# Patient Record
Sex: FEMALE | Race: WHITE | Employment: FULL TIME | URBAN - METROPOLITAN AREA
[De-identification: names, ages, dates, MRNs, and addresses within clinical notes are randomized per-mention and may not be internally consistent; named-entity substitution may affect disease eponyms.]

---

## 2017-02-16 ENCOUNTER — TRANSFERRED RECORDS (OUTPATIENT)
Dept: HEALTH INFORMATION MANAGEMENT | Facility: CLINIC | Age: 52
End: 2017-02-16

## 2017-05-04 ENCOUNTER — TRANSFERRED RECORDS (OUTPATIENT)
Dept: HEALTH INFORMATION MANAGEMENT | Facility: CLINIC | Age: 52
End: 2017-05-04

## 2017-05-04 LAB
HPV ABSTRACT: NORMAL
PAP-ABSTRACT: NORMAL

## 2017-08-18 ENCOUNTER — HEALTH MAINTENANCE LETTER (OUTPATIENT)
Age: 52
End: 2017-08-18

## 2018-03-23 ENCOUNTER — MYC MEDICAL ADVICE (OUTPATIENT)
Dept: FAMILY MEDICINE | Facility: OTHER | Age: 53
End: 2018-03-23

## 2018-03-23 DIAGNOSIS — Z12.11 SPECIAL SCREENING FOR MALIGNANT NEOPLASMS, COLON: Primary | ICD-10-CM

## 2018-03-26 ENCOUNTER — TELEPHONE (OUTPATIENT)
Dept: FAMILY MEDICINE | Facility: OTHER | Age: 53
End: 2018-03-26

## 2018-03-26 NOTE — TELEPHONE ENCOUNTER
Left message for patient to return call to schedule EGD/colonoscopy. If Briana or Ania are not available, please transfer to same day surgery      Home # disc, call Cell [phone]   schedule with REX or evelin

## 2018-03-27 NOTE — TELEPHONE ENCOUNTER
Left message for patient to return call to schedule colonoscopy or EGD. If Ania or Briana are unavailable, please transfer to the surgery center.

## 2018-03-29 ENCOUNTER — TELEPHONE (OUTPATIENT)
Dept: FAMILY MEDICINE | Facility: OTHER | Age: 53
End: 2018-03-29

## 2018-03-29 NOTE — TELEPHONE ENCOUNTER
Reason for Call:  Other appointment    Detailed comments: please call to schedule colonoscopy    Phone Number Patient can be reached at: Home number on file [phone] (home)    Best Time: anytime    Can we leave a detailed message on this number? YES    Call taken on 3/29/2018 at 10:31 AM by Ayesha Guaman

## 2018-04-17 ENCOUNTER — TELEPHONE (OUTPATIENT)
Dept: FAMILY MEDICINE | Facility: OTHER | Age: 53
End: 2018-04-17

## 2018-04-17 DIAGNOSIS — Z12.11 SPECIAL SCREENING FOR MALIGNANT NEOPLASMS, COLON: Primary | ICD-10-CM

## 2018-04-17 NOTE — TELEPHONE ENCOUNTER
Received following message in inbasket, but wouldn't let me open an encounter, so copied below:    Juan Harrington RN Christiaansen, Tori F, MD        Caller: Unspecified (Yesterday,  1:18 PM)                     Pt would prefer a different prep for her colonoscopy. States her  had a Rx prep that seemed easier than the miralax mixing and she would prefer this. Please contact in regards to this.     Thanks, Juan PATRICK         I don't know what prep she is talking about, but could send over the Golyteley as that is the only other prep I know, if that is what she wants.    FYI--I haven't seen this patient since 2012.

## 2018-04-17 NOTE — TELEPHONE ENCOUNTER
Left message for pt to call back, please have her clarify prep for colonoscopy.  Per TC she has not seen pt since 2012.  Elizabet Sharma, CMA

## 2018-04-17 NOTE — TELEPHONE ENCOUNTER
Spoke to patient and informed her of message below plus I sent her mychart message informing her of this plus the prep instructions.

## 2018-04-17 NOTE — TELEPHONE ENCOUNTER
Spoke with pt, she would definitely like to try the Surinder. Please send rx to [facility].     Thank you.

## 2018-04-23 ENCOUNTER — HOSPITAL ENCOUNTER (OUTPATIENT)
Facility: CLINIC | Age: 53
Discharge: HOME | End: 2018-04-23
Attending: INTERNAL MEDICINE | Admitting: INTERNAL MEDICINE
Payer: COMMERCIAL

## 2018-04-23 ENCOUNTER — SURGERY (OUTPATIENT)
Age: 53
End: 2018-04-23

## 2018-04-23 VITALS
DIASTOLIC BLOOD PRESSURE: 50 MMHG | RESPIRATION RATE: 16 BRPM | OXYGEN SATURATION: 99 % | TEMPERATURE: 98.4 F | HEART RATE: 90 BPM | SYSTOLIC BLOOD PRESSURE: 100 MMHG

## 2018-04-23 LAB — COLONOSCOPY: NORMAL

## 2018-04-23 PROCEDURE — 25000128 H RX IP 250 OP 636: Performed by: INTERNAL MEDICINE

## 2018-04-23 PROCEDURE — 45378 DIAGNOSTIC COLONOSCOPY: CPT | Performed by: INTERNAL MEDICINE

## 2018-04-23 PROCEDURE — 25000125 ZZHC RX 250: Performed by: INTERNAL MEDICINE

## 2018-04-23 PROCEDURE — 25000132 ZZH RX MED GY IP 250 OP 250 PS 637: Performed by: INTERNAL MEDICINE

## 2018-04-23 PROCEDURE — G0121 COLON CA SCRN NOT HI RSK IND: HCPCS | Performed by: INTERNAL MEDICINE

## 2018-04-23 PROCEDURE — 40000296 ZZH STATISTIC ENDO RECOVERY CLASS 1:2 FIRST HOUR: Performed by: INTERNAL MEDICINE

## 2018-04-23 PROCEDURE — G0500 MOD SEDAT ENDO SERVICE >5YRS: HCPCS

## 2018-04-23 RX ORDER — SIMETHICONE
LIQUID (ML) MISCELLANEOUS PRN
Status: DISCONTINUED | OUTPATIENT
Start: 2018-04-23 | End: 2018-04-23 | Stop reason: HOSPADM

## 2018-04-23 RX ORDER — FENTANYL CITRATE 50 UG/ML
INJECTION, SOLUTION INTRAMUSCULAR; INTRAVENOUS PRN
Status: DISCONTINUED | OUTPATIENT
Start: 2018-04-23 | End: 2018-04-23 | Stop reason: HOSPADM

## 2018-04-23 RX ORDER — ONDANSETRON 2 MG/ML
4 INJECTION INTRAMUSCULAR; INTRAVENOUS
Status: DISCONTINUED | OUTPATIENT
Start: 2018-04-23 | End: 2018-04-23 | Stop reason: HOSPADM

## 2018-04-23 RX ORDER — LIDOCAINE 40 MG/G
CREAM TOPICAL
Status: DISCONTINUED | OUTPATIENT
Start: 2018-04-23 | End: 2018-04-23 | Stop reason: HOSPADM

## 2018-04-23 RX ADMIN — FENTANYL CITRATE 50 MCG: 50 INJECTION, SOLUTION INTRAMUSCULAR; INTRAVENOUS at 10:40

## 2018-04-23 RX ADMIN — MIDAZOLAM 2 MG: 1 INJECTION INTRAMUSCULAR; INTRAVENOUS at 10:40

## 2018-04-23 RX ADMIN — FENTANYL CITRATE 25 MCG: 50 INJECTION, SOLUTION INTRAMUSCULAR; INTRAVENOUS at 10:47

## 2018-04-23 RX ADMIN — Medication 1 ML: at 10:40

## 2018-04-23 RX ADMIN — LIDOCAINE HYDROCHLORIDE 1 ML: 10 INJECTION, SOLUTION EPIDURAL; INFILTRATION; INTRACAUDAL at 09:59

## 2018-04-23 RX ADMIN — FENTANYL CITRATE 25 MCG: 50 INJECTION, SOLUTION INTRAMUSCULAR; INTRAVENOUS at 10:50

## 2018-04-23 RX ADMIN — MIDAZOLAM 1 MG: 1 INJECTION INTRAMUSCULAR; INTRAVENOUS at 10:42

## 2018-04-23 RX ADMIN — MIDAZOLAM 1 MG: 1 INJECTION INTRAMUSCULAR; INTRAVENOUS at 10:41

## 2018-04-23 RX ADMIN — MIDAZOLAM 1 MG: 1 INJECTION INTRAMUSCULAR; INTRAVENOUS at 10:43

## 2018-04-23 RX ADMIN — MIDAZOLAM 1 MG: 1 INJECTION INTRAMUSCULAR; INTRAVENOUS at 10:52

## 2018-04-23 NOTE — CONSULTS
[facility] GI Pre-Procedure Physical Assessment    Michelle Roach MRN# [id number]   Age: 52 year old YOB: 1965      Date of Surgery: 4/23/2018  Location [facility]      Date of Exam 4/23/2018 Facility (Same day)       Home clinic: [facility]  Primary care provider: Deborah Vaughn         Active problem list:   Patient Active Problem List   Diagnosis     GERD (gastroesophageal reflux disease)            Medications (include herbals and vitamins):   Any Plavix use in the last 7 days?  No     Current Facility-Administered Medications   Medication     lidocaine (LMX4) kit     lidocaine 1 % 1 mL     ondansetron (ZOFRAN) injection 4 mg     sodium chloride (PF) 0.9% PF flush 3 mL     sodium chloride (PF) 0.9% PF flush 3 mL             Allergies:      Allergies   Allergen Reactions     Bactrim Hives     Codeine      migraines     Erythromycin      GI upset     Allergy to Latex?  No  Allergy to tape?    No          Social History:     Social History   Substance Use Topics     Smoking status: Never Smoker     Smokeless tobacco: Never Used      Comment: No smokers in home     Alcohol use No            Physical Exam:   All vitals have been reviewed  Blood pressure 107/65, pulse 90, temperature 98.4  F (36.9  C), temperature source Oral, resp. rate 18, SpO2 98 %.  Airway assessment:   Patient is able to open mouth wide  Patient is able to stick out tongue      Lungs:   No increased work of breathing, good air exchange, clear to auscultation bilaterally, no crackles or wheezing      Cardiovascular:   Normal apical impulse, regular rate and rhythm, normal S1 and S2, no S3 or S4, and no murmur noted           Lab / Radiology Results:   All laboratory data reviewed          Assessment:   Appropriately NPO  Chief complaint or anatomic assessment of involved area: screening colonoscopy         Plan:   Moderate (conscious) sedation     Patient's active problems diagnostically  and therapeutically optimized for the planned procedure  Risks, benefits, alternatives to sedation and blood explained and consent obtained  Risks, benefits, alternatives to procedure explained and consent obtained  Orders and progress notes are in the chart  Discharge from Phase 1 and / or Phase 2 recovery when patient meets criteria    I have reviewed the history and physical, lab finding(s), diagnostic data, medicaitons, and the plan for sedation.  I have determined this patient to be an appropriate candidate for the planned sedation / procedure and have reassessed the patient immediately prior to sedation / procedure.    I have personally and medically directed the administration of medications used.    Michael Mims MD

## 2018-06-27 ENCOUNTER — RADIANT APPOINTMENT (OUTPATIENT)
Dept: MAMMOGRAPHY | Facility: CLINIC | Age: 53
End: 2018-06-27
Attending: FAMILY MEDICINE
Payer: COMMERCIAL

## 2018-06-27 DIAGNOSIS — R92.8 ABNORMAL MAMMOGRAM: Primary | ICD-10-CM

## 2018-06-27 DIAGNOSIS — Z12.31 VISIT FOR SCREENING MAMMOGRAM: ICD-10-CM

## 2018-06-27 PROCEDURE — 77067 SCR MAMMO BI INCL CAD: CPT | Performed by: STUDENT IN AN ORGANIZED HEALTH CARE EDUCATION/TRAINING PROGRAM

## 2018-06-29 ENCOUNTER — E-VISIT (OUTPATIENT)
Dept: FAMILY MEDICINE | Facility: OTHER | Age: 53
End: 2018-06-29
Payer: COMMERCIAL

## 2018-06-29 DIAGNOSIS — R30.0 DYSURIA: Primary | ICD-10-CM

## 2018-06-29 PROCEDURE — 99444 ZZC PHYSICIAN ONLINE EVALUATION & MANAGEMENT SERVICE: CPT | Performed by: FAMILY MEDICINE

## 2018-07-03 ENCOUNTER — RADIANT APPOINTMENT (OUTPATIENT)
Dept: ULTRASOUND IMAGING | Facility: CLINIC | Age: 53
End: 2018-07-03
Attending: FAMILY MEDICINE
Payer: COMMERCIAL

## 2018-07-03 ENCOUNTER — RADIANT APPOINTMENT (OUTPATIENT)
Dept: MAMMOGRAPHY | Facility: CLINIC | Age: 53
End: 2018-07-03
Attending: FAMILY MEDICINE
Payer: COMMERCIAL

## 2018-07-03 DIAGNOSIS — R92.8 ABNORMAL MAMMOGRAM: ICD-10-CM

## 2018-07-03 PROCEDURE — 77065 DX MAMMO INCL CAD UNI: CPT | Performed by: STUDENT IN AN ORGANIZED HEALTH CARE EDUCATION/TRAINING PROGRAM

## 2018-07-03 PROCEDURE — G0279 TOMOSYNTHESIS, MAMMO: HCPCS | Performed by: STUDENT IN AN ORGANIZED HEALTH CARE EDUCATION/TRAINING PROGRAM

## 2018-07-03 PROCEDURE — 76642 ULTRASOUND BREAST LIMITED: CPT | Mod: RT | Performed by: STUDENT IN AN ORGANIZED HEALTH CARE EDUCATION/TRAINING PROGRAM

## 2019-02-05 ENCOUNTER — OFFICE VISIT (OUTPATIENT)
Dept: PEDIATRICS | Facility: CLINIC | Age: 54
End: 2019-02-05
Payer: COMMERCIAL

## 2019-02-05 VITALS
DIASTOLIC BLOOD PRESSURE: 65 MMHG | HEART RATE: 70 BPM | SYSTOLIC BLOOD PRESSURE: 106 MMHG | OXYGEN SATURATION: 97 % | BODY MASS INDEX: 21.51 KG/M2 | WEIGHT: 129.1 LBS | TEMPERATURE: 98 F | HEIGHT: 65 IN

## 2019-02-05 DIAGNOSIS — H61.21 IMPACTED CERUMEN OF RIGHT EAR: ICD-10-CM

## 2019-02-05 DIAGNOSIS — H93.8X1 SENSATION OF PLUGGED EAR ON RIGHT SIDE: Primary | ICD-10-CM

## 2019-02-05 PROCEDURE — 99213 OFFICE O/P EST LOW 20 MIN: CPT | Mod: 25 | Performed by: FAMILY MEDICINE

## 2019-02-05 PROCEDURE — 69209 REMOVE IMPACTED EAR WAX UNI: CPT | Mod: RT | Performed by: FAMILY MEDICINE

## 2019-02-05 ASSESSMENT — PAIN SCALES - GENERAL: PAINLEVEL: NO PAIN (0)

## 2019-02-05 ASSESSMENT — MIFFLIN-ST. JEOR: SCORE: 1190.43

## 2019-02-05 NOTE — PROGRESS NOTES
SUBJECTIVE:   Michelle Roach is a 53 year old female who presents to clinic today for the following health issues    Patient with no significant past medical history is here with concerns of having blood to sensation of hearing loss.  Had history of impacted wax in the past.  Patient denies current concerning symptoms including left ear symptoms, sore throat, history of allergies,:  Denies ear pain or trauma  Patient tried over-the-counter Debrox eardrops with worsening symptoms    Acute Illness   Acute illness concerns: patient is not able to hear out of right ear. No pain. No other symptoms. Patient states history of wax build up in that ear.  Onset: 3 weeks    Fever: no    Chills/Sweats: no    Headache (location?): no    Sinus Pressure:no    Conjunctivitis:  no    Ear Pain: no    Rhinorrhea: no    Congestion: no    Sore Throat: no     Cough: no    Wheeze: no    Decreased Appetite: no    Nausea: no    Vomiting: no    Diarrhea:  no    Dysuria/Freq.: no    Fatigue/Achiness: no    Sick/Strep Exposure: no     Therapies Tried and outcome: debrox ears drops - made it worse          Problem list and histories reviewed & adjusted, as indicated.  Additional history: as documented    Patient Active Problem List   Diagnosis     GERD (gastroesophageal reflux disease)     Past Surgical History:   Procedure Laterality Date     C NONSPECIFIC PROCEDURE  1998    LEEP     COLONOSCOPY N/A 4/23/2018    Procedure: COLONOSCOPY;  colonoscopy;  Surgeon: Michael Mims MD;  Location: [facility]     PELVIS LAPAROSCOPY,DX  1996    Endometriosis       Social History     Tobacco Use     Smoking status: Never Smoker     Smokeless tobacco: Never Used     Tobacco comment: No smokers in home   Substance Use Topics     Alcohol use: No     Family History   Problem Relation Age of Onset     Cancer Paternal Grandfather          Current Outpatient Medications   Medication Sig Dispense Refill     Acetaminophen (TYLENOL EXTRA STRENGTH PO) Take 1 tablet by  "mouth as needed.       B Complex Vitamins (VITAMIN B COMPLEX PO) Take 1 tablet by mouth daily.       Cholecalciferol (VITAMIN D) 1000 UNIT capsule Take 1 capsule by mouth daily.       FLONASE INHA 50 MCG/DOSE NA INHALE 2 SPRAYS IN EACH NOSTRIL ONCE DAILY 1 Inhaler 4     Multiple Vitamin (MULTIVITAMINS PO) Take 1 tablet by mouth daily.       Allergies   Allergen Reactions     Bactrim Hives     Codeine      migraines     Erythromycin      GI upset     Recent Labs   Lab Test 03/21/12  0953   CR 0.57   GFRESTIMATED >90   GFRESTBLACK >90   POTASSIUM 4.7   TSH 1.48      BP Readings from Last 3 Encounters:   02/05/19 106/65   04/23/18 100/50   06/17/15 108/66    Wt Readings from Last 3 Encounters:   02/05/19 58.6 kg (129 lb 1.6 oz)   06/17/15 58.4 kg (128 lb 12.8 oz)   03/13/13 57.6 kg (126 lb 14.4 oz)                  Labs reviewed in EPIC    Reviewed and updated as needed this visit by clinical staff       Reviewed and updated as needed this visit by Provider         ROS:  CONSTITUTIONAL: NEGATIVE for fever, chills, change in weight  INTEGUMENTARY/SKIN: NEGATIVE for worrisome rashes, moles or lesions  EYES: NEGATIVE for vision changes or irritation  ENT/MOUTH: as above  RESP: NEGATIVE for significant cough or SOB  CV: NEGATIVE for chest pain, palpitations or peripheral edema    OBJECTIVE:     /65 (BP Location: Right arm, Patient Position: Sitting, Cuff Size: Adult Regular)   Pulse 70   Temp 98  F (36.7  C) (Oral)   Ht 1.657 m (5' 5.25\")   Wt 58.6 kg (129 lb 1.6 oz)   SpO2 97%   BMI 21.32 kg/m    Body mass index is 21.32 kg/m .  GENERAL: healthy, alert and no distress  EYES: Eyes grossly normal to inspection  HENT: normal cephalic/atraumatic, right ear: occluded with wax, left ear: normal: no effusions, no erythema, normal landmarks, nose and mouth without ulcers or lesions, oropharynx clear and oral mucous membranes moist  NECK: no adenopathy, no asymmetry, masses, or scars and thyroid normal to " palpation    Diagnostic Test Results:  none     ASSESSMENT/PLAN:             1. Sensation of plugged ear on right side  ddx-Earwax this is eustachian tube dysfunction    2. Impacted cerumen of right ear  Ear wash was done by the [facility] staff  Moderate amount of wax was removed  Recommended not to use Q-tips  Can try over-the-counter ear washes  Follow-up as needed  Patient can try over-the-counter Debrox eardrops 2-3 times a week as a routine to prevent future episodes of wax impaction  Patient verbalised understanding and is agreeable to the plan.    - HC REMOVAL IMPACTED CERUMEN IRRIGATION/LVG UNILAT    Chart documentation done in part with Dragon Voice recognition Software. Although reviewed after completion, some word and grammatical error may remain.    See Patient Instructions    Willis Landaverde MD  [facility]

## 2019-02-05 NOTE — PATIENT INSTRUCTIONS
Patient Education     Impacted Earwax     Inner ear structures including ear canal and eardrum.     Impacted earwax is a buildup of the natural wax in the ear (cerumen). Impacted earwax is very common. It can cause symptoms such as hearing loss. It can also make it difficult for a doctor to examine your ear.  Understanding earwax  Tiny glands in your ear make substances that combine with dead skin cells to form earwax. Earwax helps protect your ear canal from water, dirt, infection, and injury. Over time, earwax travels from the inner part of your ear canal to the entrance of the canal. Then it falls away naturally. But in some cases, it can t travel to the entrance of the canal. This may be because of a health condition or objects put in the ear. With age, earwax tends to become harder and less fluid. Older adults are more likely to have problems with earwax buildup.  What causes impacted earwax?  Earwax can build up because of many health conditions. Some cause a physical blockage. Others cause too much earwax to be made. Health conditions that can cause earwax buildup include:    Use of cotton swabs to clean deep in the ear canal    Bony blockage in the ear (osteoma or exostoses)    Infections, such as  infection of the outer ear (external otitis)    Skin disease, such as eczema    Autoimmune diseases, such as lupus    A narrowed ear canal from birth, chronic inflammation, or injury    Too much earwax because of injury    Too much earwax because of  water in the ear canal  Objects repeatedly placed in the ear can also cause impacted earwax. For example, putting cotton swabs in the ear may push the wax deeper into the ear. Over time, this may cause blockage. Hearing aids, swimming plugs, and swim molds can cause the same problem when used again and again.  In some cases, the cause of impacted earwax is not known.  Symptoms of impacted earwax  Excess earwax usually does not cause any symptoms, unless there is a  large amount of buildup. Then it may cause symptoms such as:    Hearing loss    Earache    Sense of ear fullness    Itching in the ear    Odor from the ear    Ear drainage    Dizziness    Ringing in the ears    Cough  Treatment for impacted earwax  If you don t have symptoms, you may not need treatment. Often, the earwax goes away on its own with time. If you have symptoms, you may have one or more treatments such as:    Eardrops to soften the earwax. This helps it leave the ear over time.    Rinsing (irrigation) of the ear canal with water. This is done in a doctor s office.    Removal of the earwax with small tools. This is also done in a doctor s office.  In rare cases, some treatments for earwax removal may cause complications such as:    Infection of the outer ear (otitis external)    Earache    Short-term hearing loss    Dizziness    Water trapped in the ear canal    Hole in the eardrum    Ringing in the ears    Bleeding from the ear  Talk with your healthcare provider about which risks apply most to you.  Don t use these at home  Healthcare providers do not advise use of ear candles or ear vacuum kits. These methods are not shown to work and may cause problems.   Preventing impacted earwax  You may not be able to prevent impacted earwax if you have a health condition that causes it, such as eczema. In other cases, you may be able to prevent earwax buildup by:    Using ear drops once a week    Having routine cleaning of the ear about every 6 months    Not using cotton swabs in the ear  When to call the healthcare provider  Call your healthcare provider if you have symptoms of impacted earwax. Also call right away if you have severe symptoms after earwax removal. These may include bleeding or severe ear pain.   Date Last Reviewed: 5/1/2017 2000-2018 [employer]. [address] [place] [address], [place] [address]. All rights reserved. This information is not intended as a substitute for professional  medical care. Always follow your healthcare professional's instructions.           Patient Education       Earwax, Home Treatment    Everyone produces earwax from the lining of the ear canal. It serves to lubricate and protect the ear. The wax that forms in the canal naturally moves toward the outside of the ear and falls out. Sometimes the ear canal may contain too much wax. This can cause a blockage and loss of hearing. Directions are given below for home treatment.  Home care  If your doctor has advised you to remove a wax blockage yourself, follow these directions:    Unless a medicine was prescribed, you may use an over-the-counter product made for clearing earwax. These contain carbamide peroxide. Lie down with the blocked ear facing upward. Apply one dropper full of medicine and wait a few minutes. Grasp the outer ear and wiggle it to help the solution enter the canal.    Lean over a sink or basin with the blocked ear facing downward. Use a bulb syringe filled with warm (not hot or cold) water to rinse the ear several times. Use gentle pressure only.    If you are having trouble draining the water out of your ear canal, put a few drops of rubbing alcohol (isopropyl alcohol) into the ear canal. This will help remove the remaining water.    Repeat this procedure once a day for up to three days, or until your hearing is back to normal. Do not use this treatment for more than three days in a row.  Don ts    Don t use cold water to rinse the ear. This will make you dizzy.    Don t perform this procedure if you have an ear infection.    Don t perform this procedure if you have a ruptured eardrum.    Don t use cotton swabs, matches, hairpins, keys, or other objects to  clean  the ear canal. This can cause infection of the ear canal or rupture the eardrum. Because of their size and shape, cotton swabs can push earwax deeper into the ear canal instead of removing it.  Follow-up care  Follow up with your health care  provider if you are not improving after three cleaning attempts, or as advised.  When to seek medical advice  Call your health care provider right away if any of these occur:    Worsening ear pain    Fever of 101 F (38.3 C) or higher, or as directed by your health care provider    Hearing does not return to normal after three days of treatment    Fluid drainage or bleeding from the ear canal    Swelling, redness, or tenderness of the outer ear    Headache, neck pain, or stiff neck    0391-2559 [employer]. [address] [place], [place] [id number]. All rights reserved. This information is not intended as a substitute for professional medical care. Always follow your healthcare professional's instructions.

## 2019-06-07 ENCOUNTER — OFFICE VISIT (OUTPATIENT)
Dept: ORTHOPEDICS | Facility: CLINIC | Age: 54
End: 2019-06-07
Payer: COMMERCIAL

## 2019-06-07 VITALS — BODY MASS INDEX: 21.49 KG/M2 | HEIGHT: 65 IN | WEIGHT: 129 LBS

## 2019-06-07 DIAGNOSIS — M77.12 LATERAL EPICONDYLITIS OF LEFT ELBOW: ICD-10-CM

## 2019-06-07 DIAGNOSIS — M77.11 LATERAL EPICONDYLITIS OF RIGHT ELBOW: Primary | ICD-10-CM

## 2019-06-07 PROCEDURE — 99213 OFFICE O/P EST LOW 20 MIN: CPT | Performed by: FAMILY MEDICINE

## 2019-06-07 ASSESSMENT — PAIN SCALES - GENERAL: PAINLEVEL: NO PAIN (0)

## 2019-06-07 ASSESSMENT — MIFFLIN-ST. JEOR: SCORE: 1189.98

## 2019-06-07 NOTE — PROGRESS NOTES
"      [facility]  6/7/2019    Michelle Roach's chief complaint for this visit includes:  Chief Complaint   Patient presents with     Consult     bilateral elbow/forearm pain, right greater then left. no known injury started during the winter after shoveling. no treatments to date     PCP: Deborah Vaughn    Referring Provider:  Referred Self, MD  No address on file    Ht 1.657 m (5' 5.25\")   Wt 58.5 kg (129 lb)   BMI 21.30 kg/m    No Pain (0)       Reason for visit:     What part of your body is injured / painful?  bilateral elbow/forearm     What caused the injury /pain? No inciting event     How long ago did your injury occur or pain begin? several months ago    What are your most bothersome symptoms? Pain    How would you characterize your symptom?  aching    What makes your symptoms better? Rest    What makes your symptoms worse? Movement    Have you been previously seen for this problem? No    Medical History:    Any recent changes to your medical history? No    Any new medication prescribed since last visit? No    Have you had surgery on this body part before? No    Social History:    Occupation: Property Management     Handedness: Right    Review of Systems:    Do you have fever, chills, weight loss? No    Do you have any vision problems? No    Do you have any chest pain or edema? No    Do you have any shortness of breath or wheezing?  No    Do you have stomach problems? No    Do you have any numbness or focal weakness? No    Do you have diabetes? No    Do you have problems with bleeding or clotting? No    Do you have an rashes or other skin lesions? No         "

## 2019-06-07 NOTE — PROGRESS NOTES
CHIEF COMPLAINT:  Consult (bilateral elbow/forearm pain, right greater then left. no known injury started during the winter after shoveling. no treatments to date)       HISTORY OF PRESENT ILLNESS  Ms. Roach is a pleasant 53 year old year old female who presents to clinic today with right elbow pain.  Patient states taht elbow pain began during the winter months. She does not recall one specific incident but recalls insidious onset of this pain while shoveling, especially in February.  Since this time, despite cessation of shoveling, she has had continued pain of bilateral forearms/elbow, however significantly worse on the right.  Worse with movement of arm, carrying objects and using hands.  Improved with rest.  Patient has not noticed any significant swelling or ecchymosis.  Treatment to date has included ibuprofen, forearm chopat, and relative rest from offending activity.  Her left elbow has responded relatively well to treatment but the right elbow remains recalcitrant to treatments thus far.      Additional history: as documented    MEDICAL HISTORY  Patient Active Problem List   Diagnosis     GERD (gastroesophageal reflux disease)       Current Outpatient Medications   Medication Sig Dispense Refill     diclofenac (VOLTAREN) 1 % topical gel Place 2 g onto the skin 4 times daily 100 g 3     Acetaminophen (TYLENOL EXTRA STRENGTH PO) Take 1 tablet by mouth as needed.       B Complex Vitamins (VITAMIN B COMPLEX PO) Take 1 tablet by mouth daily.       Cholecalciferol (VITAMIN D) 1000 UNIT capsule Take 1 capsule by mouth daily.       FLONASE INHA 50 MCG/DOSE NA INHALE 2 SPRAYS IN EACH NOSTRIL ONCE DAILY (Patient not taking: No sig reported) 1 Inhaler 4     Multiple Vitamin (MULTIVITAMINS PO) Take 1 tablet by mouth daily.         Allergies   Allergen Reactions     Bactrim Hives     Codeine      migraines     Erythromycin      GI upset       Family History   Problem Relation Age of Onset     Cancer Paternal  "Grandfather        Additional medical/Social/Surgical histories reviewed in [facility] and updated as appropriate.     REVIEW OF SYSTEMS (6/7/2019)  A 10-point review of systems was obtained and is negative except for as noted in the HPI.      PHYSICAL EXAM  Ht 1.657 m (5' 5.25\")   Wt 58.5 kg (129 lb)   BMI 21.30 kg/m      General  - normal appearance, in no obvious distress  CV  - normal radial pulse  Pulm  - normal respiratory pattern, non-labored  Musculoskeletal - bilateral elbow  - inspection: normal joint alignment, no obvious deformity, no soft tissue swelling laterally  - palpation: Right- tender at the origin of the common extensor tendon on the right with extension into extensor musculature. Left- mildly tender at origin of common extensor tendon only  - ROM:  160 deg flexion   0 deg extension   90 deg pronation   90 deg supination  - strength: Right- 5/5 wrist extension with elbow flexed, 5/5 with elbow extended, painful resisted extension of long finger with elbow flexed, worse with extension, 5/5  strength. Left- Mild pain with resisted wrist extension only.  - special tests:  (-) varus  (-) valgus  (-) Tinel's  Neuro  - no sensory or motor deficit, grossly normal coordination, normal muscle tone  Skin  - no ecchymosis, erythema, warmth, or induration, no obvious rash  Psych  - interactive, appropriate, normal mood and affect    IMAGING : Deferred today     ASSESSMENT & PLAN  Ms. Roach is a 53 year old year old female who presents to clinic today with right >left lateral epciondylitis.    Diagnosis: Lateral epicondylitis of bilateral upper extremities.    -Right wrist brace provided for use with activity daily  -HEP for stretching/strengthening  -Ice massage discussed  -Diclofenac topical to proximal forearms; cannot tolerate NSAIDs orally  -Follow up 4 weeks if persisting    It was a pleasure seeing Michelle today.    Michael Jarvis DO, CAQSM  [facility]    "

## 2019-06-07 NOTE — PATIENT INSTRUCTIONS
"Tennis Elbow Instructions    LATERAL EPICONDYLITIS (TENNIS ELBOW):    Pain on the outside of elbow worsened with any gripping activity even as little as lifting a coffee cup; weakness of ; aching pain    Usually there is not any numbness or tingling involved (please let us know if this is happening as it may be something else)    TREATMENT:    Avoid aggravating activities until pain free at rest and with exercises. Then return slowly with pain being your guide. IF IT HURTS, DO NOT DO IT!    Wrist brace allows the muscles to relax in neutral position. The muscles that extend and rotate your wrist are attached at the lateral elbow. It is these tendons that are painful so the wrist brace protects them (wear brace as much as possible, including sleep until pain free then may wean out. First stop during daytime but continue for sleep and activities you can do with brace on)    Brace options: \"chopat\" elbow strap relieves pressure on the tendon attachment but does squeeze muscle so may initially hurt. Often helpful once resuming activities and not using wrist brace.    Ice 10-15 minutes after activity. (or ice cup massage 5-7 min- fill joey cup and freze. peel away paper to use)    Cross-friction massage (rub painful area)    Anti-inflammatory such as Aleve (220mg) 2 tabs twice daily with food for 14 days to decrease inflammation, then as needed for pain.    Strengthening: Perform exercises as instructed either in handout or by occupational therapy.     These should be very easy with little to no weight.    Goal: 2 sets of 20 for each strength exercise, 1-2 times per day, 5 days a week    Hold stretches for 30 secs and repeat 2-3 times per day      Iglesia Twist: You may purchase a flex  bar by [employer] and perform exercises called \"Iglesia Twist\". Search online for Iglesia twist tennis elbow exercises and you will find videos on how to perform. The bar may be purchased online as well at sites such as " amazon.      You may do the stretching exercises right away. You may do the strengthening exercises when stretching is nearly painless.    STRETCHING EXERCISES    Wrist active range of motion, flexion and extension: Bend the wrist of your injured arm forward and back as far as you can. Do 2 sets of 15.    Wrist stretch: Press the back of the hand on your injured side with your other hand to help bend your wrist. Hold for 15 to 30 seconds. Next, stretch the hand back by pressing the fingers in a backward direction. Hold for 15 to 30 seconds. Keep the arm on your injured side straight during this exercise. Do 3 sets.    Forearm pronation and supination: Bend the elbow of your injured arm 90 degrees, keeping your elbow at your side. Turn your palm up and hold for 5 seconds. Then slowly turn your palm down and hold for 5 seconds. Make sure you keep your elbow at your side and bent 90 degrees while you do the exercise. Do 2 sets of 15.  Active elbow flexion and extension: Gently bring the palm of the hand on your injured side up toward your shoulder, bending your elbow as much as you can. Then straighten your elbow as far as you can. Repeat 15 times. Do 2 sets of 15.  STRENGTHENING EXERCISES    Eccentric wrist flexion: Hold a can or hammer handle in the hand of your injured side with your palm up. Use the hand on the side that is not injured to bend your wrist up. Then let go of your wrist and use just your injured side to lower the weight slowly back to the starting position. Do 3 sets of 15. Gradually increase the weight you are holding.    Eccentric wrist extension: Hold a soup can or hammer handle in the hand of your injured side with your palm facing down. Use the hand on the side that is not injured to bend your wrist up. Then let go of your wrist and use just your injured side to lower the weight slowly back to the starting position. Do 3 sets of 15. Gradually increase the weight you are holding.    Wrist radial  deviation strengthening: Put your wrist in the sideways position with your thumb up. Hold a can of soup or a hammer handle and gently bend your wrist up, with the thumb reaching toward the ceiling. Slowly lower to the starting position. Do not move your forearm throughout this exercise. Do 2 sets of 15.    Forearm pronation and supination strengthening: Hold a soup can or hammer handle in your hand and bend your elbow 90 degrees. Slowly turn your hand so your palm is up and then down. Do 2 sets of 15.  Wrist extension with broom handle: Stand up and hold a broom handle in both hands. With your arms at shoulder level, elbows straight and palms down, roll the broom handle backward in your hand. Do 2 sets of 15.

## 2019-06-07 NOTE — LETTER
"    6/7/2019         RE: Michelle Roach  [address]  [place] [address]        Dear Colleague,    Thank you for referring your patient, Michelle Raoch, to the [facility]. Please see a copy of my visit note below.          [facility]  6/7/2019    Michelle Roach's chief complaint for this visit includes:  Chief Complaint   Patient presents with     Consult     bilateral elbow/forearm pain, right greater then left. no known injury started during the winter after shoveling. no treatments to date     PCP: Deborah Vaughn    Referring Provider:  Referred Self, MD  No address on file    Ht 1.657 m (5' 5.25\")   Wt 58.5 kg (129 lb)   BMI 21.30 kg/m     No Pain (0)       Reason for visit:     What part of your body is injured / painful?  bilateral elbow/forearm     What caused the injury /pain? No inciting event     How long ago did your injury occur or pain begin? several months ago    What are your most bothersome symptoms? Pain    How would you characterize your symptom?  aching    What makes your symptoms better? Rest    What makes your symptoms worse? Movement    Have you been previously seen for this problem? No    Medical History:    Any recent changes to your medical history? No    Any new medication prescribed since last visit? No    Have you had surgery on this body part before? No    Social History:    Occupation: Property Management     Handedness: Right    Review of Systems:    Do you have fever, chills, weight loss? No    Do you have any vision problems? No    Do you have any chest pain or edema? No    Do you have any shortness of breath or wheezing?  No    Do you have stomach problems? No    Do you have any numbness or focal weakness? No    Do you have diabetes? No    Do you have problems with bleeding or clotting? No    Do you have an rashes or other skin lesions? No           CHIEF COMPLAINT:  Consult (bilateral elbow/forearm pain, right greater " then left. no known injury started during the winter after shoveling. no treatments to date)       HISTORY OF PRESENT ILLNESS  Ms. Roach is a pleasant 53 year old year old female who presents to clinic today with right elbow pain.  Patient states taht elbow pain began during the winter months. She does not recall one specific incident but recalls insidious onset of this pain while shoveling, especially in February.  Since this time, despite cessation of shoveling, she has had continued pain of bilateral forearms/elbow, however significantly worse on the right.  Worse with movement of arm, carrying objects and using hands.  Improved with rest.  Patient has not noticed any significant swelling or ecchymosis.  Treatment to date has included ibuprofen, forearm chopat, and relative rest from offending activity.  Her left elbow has responded relatively well to treatment but the right elbow remains recalcitrant to treatments thus far.      Additional history: as documented    MEDICAL HISTORY  Patient Active Problem List   Diagnosis     GERD (gastroesophageal reflux disease)       Current Outpatient Medications   Medication Sig Dispense Refill     diclofenac (VOLTAREN) 1 % topical gel Place 2 g onto the skin 4 times daily 100 g 3     Acetaminophen (TYLENOL EXTRA STRENGTH PO) Take 1 tablet by mouth as needed.       B Complex Vitamins (VITAMIN B COMPLEX PO) Take 1 tablet by mouth daily.       Cholecalciferol (VITAMIN D) 1000 UNIT capsule Take 1 capsule by mouth daily.       FLONASE INHA 50 MCG/DOSE NA INHALE 2 SPRAYS IN EACH NOSTRIL ONCE DAILY (Patient not taking: No sig reported) 1 Inhaler 4     Multiple Vitamin (MULTIVITAMINS PO) Take 1 tablet by mouth daily.         Allergies   Allergen Reactions     Bactrim Hives     Codeine      migraines     Erythromycin      GI upset       Family History   Problem Relation Age of Onset     Cancer Paternal Grandfather        Additional medical/Social/Surgical histories reviewed in  "EPIC and updated as appropriate.     REVIEW OF SYSTEMS (6/7/2019)  A 10-point review of systems was obtained and is negative except for as noted in the HPI.      PHYSICAL EXAM  Ht 1.657 m (5' 5.25\")   Wt 58.5 kg (129 lb)   BMI 21.30 kg/m       General  - normal appearance, in no obvious distress  CV  - normal radial pulse  Pulm  - normal respiratory pattern, non-labored  Musculoskeletal - bilateral elbow  - inspection: normal joint alignment, no obvious deformity, no soft tissue swelling laterally  - palpation: Right- tender at the origin of the common extensor tendon on the right with extension into extensor musculature. Left- mildly tender at origin of common extensor tendon only  - ROM:  160 deg flexion   0 deg extension   90 deg pronation   90 deg supination  - strength: Right- 5/5 wrist extension with elbow flexed, 5/5 with elbow extended, painful resisted extension of long finger with elbow flexed, worse with extension, 5/5  strength. Left- Mild pain with resisted wrist extension only.  - special tests:  (-) varus  (-) valgus  (-) Tinel's  Neuro  - no sensory or motor deficit, grossly normal coordination, normal muscle tone  Skin  - no ecchymosis, erythema, warmth, or induration, no obvious rash  Psych  - interactive, appropriate, normal mood and affect    IMAGING : Deferred today     ASSESSMENT & PLAN  Ms. Roach is a 53 year old year old female who presents to clinic today with right >left lateral epciondylitis.    Diagnosis: Lateral epicondylitis of bilateral upper extremities.    -Right wrist brace provided for use with activity daily  -HEP for stretching/strengthening  -Ice massage discussed  -Diclofenac topical to proximal forearms; cannot tolerate NSAIDs orally  -Follow up 4 weeks if persisting    It was a pleasure seeing Michelle today.    Michael Jarvis, , CAM  [facility]      Again, thank you for allowing me to participate in the care of your patient.  "       Sincerely,        Michael Jarvis, DO

## 2019-09-27 ENCOUNTER — HEALTH MAINTENANCE LETTER (OUTPATIENT)
Age: 54
End: 2019-09-27

## 2019-10-14 ENCOUNTER — TELEPHONE (OUTPATIENT)
Dept: FAMILY MEDICINE | Facility: OTHER | Age: 54
End: 2019-10-14

## 2019-10-14 NOTE — TELEPHONE ENCOUNTER
Please abstract the following data from this visit with this patient into the appropriate field in Epic:    Tests that can be patient reported without a hard copy:    Pap smear done on this date: 05/04/2017 (approximately), by this group: [facility], results in care everywhere.    HPV co-testing  05/04/2017 and was negative     Other Tests found in the patient's chart through Chart Review/Care Everywhere:        Note to [employer]straction: If this section is blank, no results were found via Chart Review/Care Everywhere.      Panel Management Review      Patient has the following on her problem list: None      Composite cancer screening  Chart review shows that this patient is due/due soon for the following Pap Smear  Summary:    Patient is due/failing the following:   PAP    Action needed:   Patient needs office visit for PAP.    Type of outreach:    none per care everywhere UTD    Questions for provider review:    None                                                                                                                                    Maryan Knight CMA     Chart routed to Care Team .

## 2019-10-30 ENCOUNTER — OFFICE VISIT (OUTPATIENT)
Dept: FAMILY MEDICINE | Facility: CLINIC | Age: 54
End: 2019-10-30
Payer: COMMERCIAL

## 2019-10-30 VITALS
HEART RATE: 70 BPM | TEMPERATURE: 98.8 F | WEIGHT: 131 LBS | BODY MASS INDEX: 21.83 KG/M2 | SYSTOLIC BLOOD PRESSURE: 112 MMHG | HEIGHT: 65 IN | DIASTOLIC BLOOD PRESSURE: 70 MMHG | OXYGEN SATURATION: 99 %

## 2019-10-30 DIAGNOSIS — R10.13 EPIGASTRIC PAIN: Primary | ICD-10-CM

## 2019-10-30 DIAGNOSIS — K44.9 HIATAL HERNIA: ICD-10-CM

## 2019-10-30 LAB
ALBUMIN SERPL-MCNC: 4.2 G/DL (ref 3.4–5)
ALP SERPL-CCNC: 72 U/L (ref 40–150)
ALT SERPL W P-5'-P-CCNC: 20 U/L (ref 0–50)
AST SERPL W P-5'-P-CCNC: 13 U/L (ref 0–45)
BILIRUB DIRECT SERPL-MCNC: 0.1 MG/DL (ref 0–0.2)
BILIRUB SERPL-MCNC: 0.4 MG/DL (ref 0.2–1.3)
ERYTHROCYTE [DISTWIDTH] IN BLOOD BY AUTOMATED COUNT: 13.1 % (ref 10–15)
HCT VFR BLD AUTO: 41.3 % (ref 35–47)
HGB BLD-MCNC: 13.6 G/DL (ref 11.7–15.7)
LIPASE SERPL-CCNC: 125 U/L (ref 73–393)
MCH RBC QN AUTO: 30.1 PG (ref 26.5–33)
MCHC RBC AUTO-ENTMCNC: 32.9 G/DL (ref 31.5–36.5)
MCV RBC AUTO: 91 FL (ref 78–100)
PLATELET # BLD AUTO: 174 10E9/L (ref 150–450)
PROT SERPL-MCNC: 7.4 G/DL (ref 6.8–8.8)
RBC # BLD AUTO: 4.52 10E12/L (ref 3.8–5.2)
WBC # BLD AUTO: 5.3 10E9/L (ref 4–11)

## 2019-10-30 PROCEDURE — 85027 COMPLETE CBC AUTOMATED: CPT | Performed by: FAMILY MEDICINE

## 2019-10-30 PROCEDURE — 99214 OFFICE O/P EST MOD 30 MIN: CPT | Performed by: FAMILY MEDICINE

## 2019-10-30 PROCEDURE — 36415 COLL VENOUS BLD VENIPUNCTURE: CPT | Performed by: FAMILY MEDICINE

## 2019-10-30 PROCEDURE — 83690 ASSAY OF LIPASE: CPT | Performed by: FAMILY MEDICINE

## 2019-10-30 PROCEDURE — 80076 HEPATIC FUNCTION PANEL: CPT | Performed by: FAMILY MEDICINE

## 2019-10-30 RX ORDER — ELETRIPTAN HYDROBROMIDE 40 MG/1
20 TABLET, FILM COATED ORAL PRN
COMMUNITY
Start: 2017-07-24

## 2019-10-30 ASSESSMENT — PATIENT HEALTH QUESTIONNAIRE - PHQ9
SUM OF ALL RESPONSES TO PHQ QUESTIONS 1-9: 9
5. POOR APPETITE OR OVEREATING: MORE THAN HALF THE DAYS

## 2019-10-30 ASSESSMENT — ANXIETY QUESTIONNAIRES
3. WORRYING TOO MUCH ABOUT DIFFERENT THINGS: NEARLY EVERY DAY
2. NOT BEING ABLE TO STOP OR CONTROL WORRYING: NEARLY EVERY DAY
5. BEING SO RESTLESS THAT IT IS HARD TO SIT STILL: SEVERAL DAYS
6. BECOMING EASILY ANNOYED OR IRRITABLE: SEVERAL DAYS
GAD7 TOTAL SCORE: 13
1. FEELING NERVOUS, ANXIOUS, OR ON EDGE: NEARLY EVERY DAY
7. FEELING AFRAID AS IF SOMETHING AWFUL MIGHT HAPPEN: NOT AT ALL

## 2019-10-30 ASSESSMENT — MIFFLIN-ST. JEOR: SCORE: 1194.05

## 2019-10-30 NOTE — PROGRESS NOTES
Subjective     Michelle Roach is a 54 year old female who presents to clinic today for the following health issues:    HPI   Rib PAIN     Onset: 4 months     Description:   Character: Sharp  Location: Left side lower ribs   Radiation:Left back sometimes     Intensity: mild to severe    Progression of Symptoms:  Intermittent but getting more frequent     Accompanying Signs & Symptoms:  Fever/Chills?: no   Gas/Bloating: no   Nausea: YES-sometimes but might be related to anxiety   Vomitting: no   Diarrhea?: no   Constipation:no   Dysuria or Hematuria: no    History:   Trauma: no   Previous similar pain: no  Previous tests done: none    Precipitating factors:   Does the pain change with:     Food: no      BM: no     Urination: no     Alleviating factors:  None     Therapies Tried and outcome: None     LMP:  10/3/19     SUBJECTIVE:  Here today with the above symptomatology.  My first visit with the patient the previous history reviewed with her and in her chart.  The only associated diagnosis is a history of a hiatal hernia that has been under good control for quite some time.  Does not take any antireflux medications and really does not give her any trouble.  But she is noted on and off for a number of months a sharp stabbing pain in her upper epigastric region.  Cannot figure out any provocation whatsoever.  Never seems to radiate but sometimes she does feel it toward the back.  When it is at its worst it does not necessarily make her nauseated or belching.  Is not triggered by food.  Not associate with any loose stools.  It is not palpable but she can pinpoint the area where it seems to be.  It has been bothering her the last few days but as of today she is completely symptom-free.  A couple of days ago she drank some cold milk when it was bothering her and this did seem to relieve it a little bit.    Review of systems otherwise negative.  Past medical, family, and social history reviewed and updated in  "chart.    OBJECTIVE:  /70 (BP Location: Right arm, Patient Position: Chair, Cuff Size: Adult Regular)   Pulse 70   Temp 98.8  F (37.1  C) (Oral)   Ht 1.657 m (5' 5.25\")   Wt 59.4 kg (131 lb)   LMP 10/03/2019 (Exact Date)   SpO2 99%   Breastfeeding? No   BMI 21.63 kg/m    Alert, pleasant, upbeat, and in no apparent discomfort.    Eyes normal in color  The abdomen is soft without tenderness, guarding, mass, rebound or organomegaly. Bowel sounds are normal. No CVA tenderness or inguinal adenopathy noted.  S1 and S2 normal, no murmurs, clicks, gallops or rubs. Regular rate and rhythm. Chest is clear; no wheezes or rales. No edema or JVD.   Past labs reviewed with the patient.     ASSESSMENT / PLAN:  (R10.13) Epigastric pain  (primary encounter diagnosis)  Comment: Currently symptom-free and there really is not much else to go on.  Discussed with patient that it is difficult to figure out the source of pain and she certainly understands that.  So we decided to take this as a stepwise investigation.  We will start with some lab work taking a look at liver, pancreas, gallbladder, etc.  Assuming these are normal we can decide whether to continue to observe this or consider imaging with an ultrasound or CT scan.  If it flares up again I asked her to try taking an acid blocker for a week or so and see if this helps  Plan: Hepatic panel (Albumin, ALT, AST, Bili, Alk         Phos, TP), Lipase, CBC with platelets            (K44.9) Hiatal hernia  Comment: Possibly related to the above  Plan:     Follow up I will contact her with results in a day or 2 and we can set up follow-up plans based upon progression  S. Eric Torres MD    (Chart documentation completed in part with Dragon voice-recognition software.  Even though reviewed some grammatical, spelling, and word errors may remain.)       "

## 2019-10-30 NOTE — PATIENT INSTRUCTIONS
We can take this step by step to decide whether this is something to be worried about or not:    1) we will take a look today and lab work that might indicate an issue with liver, gallbladder, pancreas, etc.    2) since the milk seemed to help a little, and you do have a history of a hiatal hernia, it might be worthwhile to try something like a Pepcid or Prilosec once daily for a week or 2 to see if this all goes away.  (The hard part is deciding whether to do this now since you do not have any symptoms, or wait until symptoms appear again.  Either way is fine.)    3) assuming all of the lab work looks normal and other measures are not helping we could always set up perhaps an ultrasound of the area to make sure there is nothing anatomically causing this (growth, etc.)

## 2019-10-31 ASSESSMENT — ANXIETY QUESTIONNAIRES: GAD7 TOTAL SCORE: 13

## 2019-10-31 NOTE — RESULT ENCOUNTER NOTE
Michelle,  All of the lab work was completely normal.  So nothing concerning from a functional standpoint.  We can decide whether to keep an eye on it for now, or consider the ultrasound we discussed.  Give it some thought and let me know.  AB Torres M.D.

## 2020-01-16 ENCOUNTER — OFFICE VISIT (OUTPATIENT)
Dept: GASTROENTEROLOGY | Facility: CLINIC | Age: 55
End: 2020-01-16
Payer: COMMERCIAL

## 2020-01-16 VITALS
DIASTOLIC BLOOD PRESSURE: 67 MMHG | OXYGEN SATURATION: 98 % | WEIGHT: 131.8 LBS | BODY MASS INDEX: 21.18 KG/M2 | HEIGHT: 66 IN | SYSTOLIC BLOOD PRESSURE: 116 MMHG | HEART RATE: 82 BPM

## 2020-01-16 DIAGNOSIS — K21.9 GASTROESOPHAGEAL REFLUX DISEASE, ESOPHAGITIS PRESENCE NOT SPECIFIED: ICD-10-CM

## 2020-01-16 DIAGNOSIS — R10.12 LUQ ABDOMINAL PAIN: Primary | ICD-10-CM

## 2020-01-16 DIAGNOSIS — K44.9 HIATAL HERNIA: ICD-10-CM

## 2020-01-16 PROCEDURE — 99203 OFFICE O/P NEW LOW 30 MIN: CPT | Performed by: PHYSICIAN ASSISTANT

## 2020-01-16 RX ORDER — OMEPRAZOLE 40 MG/1
40 CAPSULE, DELAYED RELEASE ORAL EVERY MORNING
Qty: 30 CAPSULE | Refills: 3 | Status: SHIPPED | OUTPATIENT
Start: 2020-01-16

## 2020-01-16 ASSESSMENT — MIFFLIN-ST. JEOR: SCORE: 1201.65

## 2020-01-16 ASSESSMENT — PAIN SCALES - GENERAL: PAINLEVEL: NO PAIN (0)

## 2020-01-16 NOTE — PROGRESS NOTES
GASTROENTEROLOGY NEW PATIENT CLINIC VISIT    CC/REFERRING MD:    Deborah Vaughn    REASON FOR CONSULTATION:  Gastrointestinal Problem (daily on/off stomach pain since summer)     HISTORY OF PRESENT ILLNESS:    Michelle Roach is 54 year old female with hx of hiatal hernia presents for evaluation of LUQ abd pains.  Symptoms initially started last summer about 6 to 7 months ago.  Initially was very sporadic and random however since [place] 3 weeks ago she notes symptoms are worse.  She has pain to her left upper quadrant area that at times radiates to her left upper back there is no nausea or vomiting.  No dysphasia.  Lack of appetite.  She does have history of heartburn and acid reflux on occasion but has noticed this more frequently recently especially at bedtime.  She has to sleep propped up with 2 pillows.  She did try Pepcid antacid for several weeks which initially offered some relief but eventually stopped helping so she discontinued.  She denies any NSAID use.  She is not a smoker.  She drinks alcohol only on occasions.  She does admit to having some stress with work at around the holidays.    Previous work-up and primary care clinic included hepatic panel, lipase and CBC all of which were unremarkable.      PROBLEM LIST  Patient Active Problem List    Diagnosis Date Noted     Hiatal hernia 10/30/2019     Priority: Medium     GERD (gastroesophageal reflux disease) 03/21/2012     Priority: Medium       PERTINENT PAST MEDICAL HISTORY:  (I personally reviewed this history with the patient at today's visit)   Past Medical History:   Diagnosis Date     Abnormal glandular Papanicolaou smear of cervix 1998    [place] - dysplasia, LEEP     Endometriosis, site unspecified 1996     PONV (postoperative nausea and vomiting)     with general anes         PREVIOUS SURGERIES: (I personally reviewed this history with the patient at today's visit)   Past Surgical History:   Procedure Laterality Date     C  NONSPECIFIC PROCEDURE  1998    LEEP     COLONOSCOPY N/A 4/23/2018    Procedure: COLONOSCOPY;  colonoscopy;  Surgeon: Michael Mims MD;  Location: [facility]     PELVIS LAPAROSCOPY,DX  1996    Endometriosis         ALLERGIES:     Allergies   Allergen Reactions     Bactrim Hives     Codeine      migraines     Erythromycin      GI upset       PERTINENT MEDICATIONS:    Current Outpatient Medications:      Acetaminophen (TYLENOL EXTRA STRENGTH PO), Take 1 tablet by mouth as needed., Disp: , Rfl:      Cholecalciferol (VITAMIN D) 1000 UNIT capsule, Take 1 capsule by mouth daily., Disp: , Rfl:      diclofenac (VOLTAREN) 1 % topical gel, Place 2 g onto the skin 4 times daily, Disp: 100 g, Rfl: 3     eletriptan (RELPAX) 40 MG tablet, Take 20 mg by mouth, Disp: , Rfl:      FLONASE INHA 50 MCG/DOSE NA, INHALE 2 SPRAYS IN EACH NOSTRIL ONCE DAILY, Disp: 1 Inhaler, Rfl: 4     Multiple Vitamin (MULTIVITAMINS PO), Take 1 tablet by mouth daily., Disp: , Rfl:     SOCIAL HISTORY:  Social History     Socioeconomic History     Marital status:      Spouse name: Not on file     Number of children: Not on file     Years of education: Not on file     Highest education level: Not on file   Occupational History     Not on file   Social Needs     Financial resource strain: Not on file     Food insecurity:     Worry: Not on file     Inability: Not on file     Transportation needs:     Medical: Not on file     Non-medical: Not on file   Tobacco Use     Smoking status: Never Smoker     Smokeless tobacco: Never Used     Tobacco comment: No smokers in home   Substance and Sexual Activity     Alcohol use: No     Drug use: No     Sexual activity: Yes     Partners: Male     Birth control/protection: Surgical     Comment: vasectomy   Lifestyle     Physical activity:     Days per week: Not on file     Minutes per session: Not on file     Stress: Not on file   Relationships     Social connections:     Talks on phone: Not on file     Gets together: Not  "on file     Attends [employer] service: Not on file     Active member of club or organization: Not on file     Attends meetings of clubs or organizations: Not on file     Relationship status: Not on file     Intimate partner violence:     Fear of current or ex partner: Not on file     Emotionally abused: Not on file     Physically abused: Not on file     Forced sexual activity: Not on file   Other Topics Concern     Parent/sibling w/ CABG, MI or angioplasty before 65F 55M? No      Service No     Blood Transfusions No     Caffeine Concern No     Occupational Exposure No     Hobby Hazards No     Sleep Concern No     Stress Concern No     Weight Concern No     Special Diet No     Back Care No     Exercise Yes     Bike Helmet No     Seat Belt Yes     Self-Exams No   Social History Narrative     Not on file       FAMILY HISTORY: (I personally reviewed this history with the patient at today's visit)  Family History   Problem Relation Age of Onset     Cancer Paternal Grandfather       ROS:    No fevers or chills  No weight loss  No blurry vision, double vision or change in vision  No sore throat  No lymphadenopathy  No headache, paraesthesias, or weakness in a limb  No shortness of breath or wheezing  No chest pain or pressure  No arthralgias or myalgias  No rashes or skin changes  No odynophagia or dysphagia  +luq abd pain   No BRBPR, hematochezia, melena  No dysuria, frequency or urgency  No hot/cold intolerance or polyria  No anxiety or depression  PHYSICAL EXAMINATION:  Constitutional: aaox3, cooperative, pleasant, not dyspneic/diaphoretic, no acute distress  Vitals reviewed: /67 (BP Location: Left arm, Patient Position: Chair, Cuff Size: Adult Regular)   Pulse 82   Ht 1.664 m (5' 5.5\")   Wt 59.8 kg (131 lb 12.8 oz)   SpO2 98%   BMI 21.60 kg/m     Wt:   Wt Readings from Last 2 Encounters:   01/16/20 59.8 kg (131 lb 12.8 oz)   10/30/19 59.4 kg (131 lb)          Eyes: Sclera " anicteric/injected  Ears/nose/mouth/throat: Normal oropharynx without ulcers or exudate, mucus membranes moist  Neck: supple  CV: No edema, RRR  Respiratory: Unlabored breathing, CTAB  Abd: Nondistended, no masses, +bs, no hepatosplenomegaly, LUQ abd pain, no peritoneal signs  Skin: warm, perfused, no jaundice  Psych: Normal affect  MSK: tenderness over left lower rib cage       PERTINENT STUDIES: (I personally reviewed these laboratory studies today)  Most recent CBC:   Recent Labs   Lab Test 10/30/19  1348 03/21/12  0953   WBC 5.3 4.4   HGB 13.6 13.0   HCT 41.3 39.1    148*     Most recent hepatic panel:  Recent Labs   Lab Test 10/30/19  1348   ALT 20   AST 13         ASSESSMENT/PLAN:    Michelle Roach is a 54 year old female who presents for evaluation of LUQ abd pain. Symptoms correlate/concerning for gastritis and/or acid reflux. She initially improved with pepcid however discontinued this stopped working.  Recommended further evaluation at this time with an upper endoscopy to rule out gastritis, PUD.  Will start on omeprazole 40 mg daily at this time.  If EGD unremarkable and no improvement with PPI can consider imaging.    Follow up 2-3 weeks after EGD.   LUQ abdominal pain  Gastroesophageal reflux disease, esophagitis presence not specified  - omeprazole (PRILOSEC) 40 MG DR capsule  Dispense: 30 capsule; Refill: 3  Hiatal hernia  - omeprazole (PRILOSEC) 40 MG DR capsule  Dispense: 30 capsule; Refill: 3    Orders Placed This Encounter   Procedures     GASTROENTEROLOGY ADULT REF PROCEDURE ONLY [facility] [phone]; [facility]           Thank you for this consultation.  It was a pleasure to participate in the care of this patient; please contact us with any further questions.     This note was created with voice recognition software, and while reviewed for accuracy, typos may remain.     Savannah Greer PA-C  Gastroenterology  [facility]

## 2020-01-16 NOTE — PATIENT INSTRUCTIONS
Thank you for visiting our Gastroenterology office today.     Start taking omeprazole 40 mg every morning on an empty stomach about 30 minutes before breakfast.     Please call [phone] to schedule an upper endoscopy with Dr. Bah or Dr. Love at our [facility].     Schedule a follow up for 2-3 weeks after your procedure to review results and discuss next steps if any are needed.

## 2020-01-16 NOTE — NURSING NOTE
"@Michelle Roach's goals for this visit include:   Chief Complaint   Patient presents with     Gastrointestinal Problem     daily on/off stomach pain since summer - tried pepcid ac for about 3 weeks with slight relief - over [place], got worse       She requests these members of her care team be copied on today's visit information: NO    PCP: Deborah Vaughn    Referring Provider:  No referring provider defined for this encounter.    /67 (BP Location: Left arm, Patient Position: Chair, Cuff Size: Adult Regular)   Pulse 82   Ht 1.664 m (5' 5.5\")   Wt 59.8 kg (131 lb 12.8 oz)   SpO2 98%   BMI 21.60 kg/m      Do you need any medication refills at today's visit? NO    Cee Cade CMA    "

## 2020-02-20 DIAGNOSIS — Z12.31 VISIT FOR SCREENING MAMMOGRAM: ICD-10-CM

## 2020-03-03 ENCOUNTER — HOSPITAL ENCOUNTER (OUTPATIENT)
Facility: AMBULATORY SURGERY CENTER | Age: 55
End: 2020-03-03
Attending: INTERNAL MEDICINE
Payer: COMMERCIAL

## 2020-03-27 ENCOUNTER — VIRTUAL VISIT (OUTPATIENT)
Dept: GASTROENTEROLOGY | Facility: CLINIC | Age: 55
End: 2020-03-27
Payer: COMMERCIAL

## 2020-03-27 DIAGNOSIS — R10.13 DYSPEPSIA: Primary | ICD-10-CM

## 2020-03-27 DIAGNOSIS — R10.12 LUQ ABDOMINAL PAIN: ICD-10-CM

## 2020-03-27 PROCEDURE — 99214 OFFICE O/P EST MOD 30 MIN: CPT | Mod: TEL | Performed by: INTERNAL MEDICINE

## 2020-03-27 NOTE — PROGRESS NOTES
"Michelle Roach is a 54 year old female who is being evaluated via a billable telephone visit.      The patient has been notified of following:     \"This telephone visit will be conducted via a call between you and your physician/provider. We have found that certain health care needs can be provided without the need for a physical exam.  This service lets us provide the care you need with a short phone conversation.  If a prescription is necessary we can send it directly to your pharmacy.  If lab work is needed we can place an order for that and you can then stop by our lab to have the test done at a later time.    If during the course of the call the physician/provider feels a telephone visit is not appropriate, you will not be charged for this service.\"     Physician has received verbal consent for a Telephone Visit from the patient? Yes    Michelle Roach complains of abdominal pain    I have reviewed and updated the patient's Past Medical History, Social History, Family History and Medication List.    ALLERGIES  Bactrim; Codeine; and Erythromycin    Additional provider notes:     We followed up today over the phone instead of in person because of the COVID-19 epidemic.  She previously was seen by Savannah in January 2020.  She notes a several month history of left upper quadrant pain with some radiation to the back.  Initially, she had some relief of symptoms with Pepcid but then symptoms progressed.  She notes that the pain in her left upper quadrant is exacerbated by eating though there are no certain food triggers.  She notes that it feels like it is under the rib with some dullness in the back she also has bloating and belching associated.  She has been taking omeprazole since January and feels that this has not significantly improved her symptoms.  She notes that the symptoms are not significantly changed with bowel movements.  She does take FiberCon for a fiber supplement.  She also will occasionally take " Phazyme.  She is not losing weight.  There is no vomiting.  She has not seen blood in the stool.  She notes that she was diagnosed with a hiatal hernia many years ago by endoscopy but the symptoms are different than she previously experienced.  She does not drink alcohol or smoke cigarettes.  There is no family history of GI malignancy.    Assessment/Plan:    Michelle has symptoms of dyspepsia characterized by postprandial abdominal pain, bloating and belching.  It has been nonresponsive to PPI therapy.  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  She does acknowledge to that her symptoms do worsen significantly when she is under stress from work and she has a very stressful work environment frequent.  Given that her symptoms are not relenting, I do favor proceeding with an upper endoscopy as well as possibly a CT scan but I do not think that it is so time sensitive that it cannot wait until the COVID-19 epidemic is under somewhat better control.  We discussed that there is a significant risk of exposing her to COVID-19 with contact in the medical system and it would be best to wait at this point.  She is worried about the possibility of cancer in the upper GI tract so we did discuss that rates of upper GI tract cancers in the [place] and given her age to do not drink or smoke or have a family history is fairly low; she is comfortable with holding off at this time given this information.  I did recommend that she can try using FD guard 1 to 2 tablets prior to meals.  If this were to not provide her significant relief, we could also consider a low-dose nortriptyline.  She did advise to call back should symptoms worsen or she develop symptoms of weight loss, blood in the stool, significant vomiting or other issues.    Phone call duration: 25 minutes    Kelton Bah MD

## 2020-10-27 ENCOUNTER — TELEPHONE (OUTPATIENT)
Dept: GASTROENTEROLOGY | Facility: CLINIC | Age: 55
End: 2020-10-27

## 2020-10-27 NOTE — TELEPHONE ENCOUNTER
"LPN spoke with patient regarding her symptoms. Patient stated \"I spoke with Dr. Bah a while back because about a year ago I started having these severe, sharp pains in the upper left side under my ribs that shoot into my back and up towards my shoulder blade. At the time, he ordered an upper endoscopy, but then the pandemic started and they stopped doing procedures so he told me to take FD Guard. That helped for a while, but then it stopped working and the pain is back.\" Patient reports the pain is intermittent, but happens everyday and stated that at the worst the pain is an 8/10, but otherwise a 3 and very annoying and nagging. Patient is wondering if she should have the EGD done. Patient reports no other symptoms.     Xi Russo LPN    "

## 2020-10-27 NOTE — TELEPHONE ENCOUNTER
Health Call Center    Phone Message    May a detailed message be left on voicemail: yes     Reason for Call: Symptoms or Concerns     If patient has red-flag symptoms, warm transfer to triage line    Current symptom or concern: pain in abdominal on upper left side radiating to back, happens everyday, medication is not helping anymore    Symptoms have been present for:  1 years(s)    Has patient previously been seen for this? Yes    By Dr. Bah        Are there any new or worsening symptoms? Yes: Worse    Pt wanting to know if she should scheduled EGD or is there another alternative.    Action Taken: Message routed to:  Adult Clinics: Gastroenterology (GI) p [phone]

## 2020-10-29 NOTE — TELEPHONE ENCOUNTER
LPN spoke with patient and notified her of providers response below. Writer offered to transfer patient to the procedure  and patient requested the telephone number to call and she would call later. Scheduling number provided to patient.     Kelton Bah MD  You; Carla Henriquez; [facility]   Gastroenterology-Adult-[place] 1 hour ago (12:21 PM)     Agree with scheduling EGD at this time.     Carla, can you help her to schedule?  Thanks,  -Dhiraj     Message text      Xi Russo LPN

## 2020-11-02 DIAGNOSIS — Z11.59 ENCOUNTER FOR SCREENING FOR OTHER VIRAL DISEASES: Primary | ICD-10-CM

## 2020-11-17 NOTE — PATIENT INSTRUCTIONS

## 2020-11-19 ENCOUNTER — OFFICE VISIT (OUTPATIENT)
Dept: FAMILY MEDICINE | Facility: OTHER | Age: 55
End: 2020-11-19
Payer: COMMERCIAL

## 2020-11-19 VITALS
TEMPERATURE: 98.8 F | WEIGHT: 134.4 LBS | HEART RATE: 79 BPM | DIASTOLIC BLOOD PRESSURE: 70 MMHG | RESPIRATION RATE: 12 BRPM | SYSTOLIC BLOOD PRESSURE: 110 MMHG | HEIGHT: 65 IN | OXYGEN SATURATION: 99 % | BODY MASS INDEX: 22.39 KG/M2

## 2020-11-19 DIAGNOSIS — Z01.818 PREOP GENERAL PHYSICAL EXAM: Primary | ICD-10-CM

## 2020-11-19 DIAGNOSIS — K44.9 HIATAL HERNIA: ICD-10-CM

## 2020-11-19 DIAGNOSIS — Z11.59 ENCOUNTER FOR SCREENING FOR OTHER VIRAL DISEASES: ICD-10-CM

## 2020-11-19 DIAGNOSIS — K21.9 GASTROESOPHAGEAL REFLUX DISEASE, UNSPECIFIED WHETHER ESOPHAGITIS PRESENT: ICD-10-CM

## 2020-11-19 PROCEDURE — 99214 OFFICE O/P EST MOD 30 MIN: CPT | Performed by: PHYSICIAN ASSISTANT

## 2020-11-19 PROCEDURE — U0003 INFECTIOUS AGENT DETECTION BY NUCLEIC ACID (DNA OR RNA); SEVERE ACUTE RESPIRATORY SYNDROME CORONAVIRUS 2 (SARS-COV-2) (CORONAVIRUS DISEASE [COVID-19]), AMPLIFIED PROBE TECHNIQUE, MAKING USE OF HIGH THROUGHPUT TECHNOLOGIES AS DESCRIBED BY CMS-2020-01-R: HCPCS | Performed by: INTERNAL MEDICINE

## 2020-11-19 ASSESSMENT — MIFFLIN-ST. JEOR: SCORE: 1202.38

## 2020-11-19 NOTE — PROGRESS NOTES
[address]  [facility] [address]  Phone: [phone]  Primary Provider: Deborah Vaughn  Pre-op Performing Provider: JIM CRUZ    PREOPERATIVE EVALUATION:  Today's date: 11/19/2020    Michelle Roach is a 55 year old female who presents for a preoperative evaluation.    Surgical Information:  Surgery/Procedure: ESOPHAGOGASTRODUODENOSCOPY, WITH CO2 INSUFFLATION  Surgery Location: [facility] OR  Surgeon: Dr. Ramon   Surgery Date: 11/23/20   Time of Surgery: 2:30  Where patient plans to recover: At home with family  Fax number for surgical facility: Note does not need to be faxed, will be available electronically in Epic.    Type of Anesthesia Anticipated: Local with MAC    Subjective     HPI related to upcoming procedure: Patient has a history of a hiatal hernia and has been experiencing worsening symptoms of dyspepsia/left upper quadrant pain so will be undergoing an upper endoscopy with Dr. Bah on 11/23/2020.       Preop Questions 11/19/2020   1. Have you ever had a heart attack or stroke? No   2. Have you ever had surgery on your heart or blood vessels, such as a stent placement, a coronary artery bypass, or surgery on an artery in your head, neck, heart, or legs? No   3. Do you have chest pain with activity? No   4. Do you have a history of  heart failure? No   5. Do you currently have a cold, bronchitis or symptoms of other infection? No   6. Do you have a cough, shortness of breath, or wheezing? No   7. Do you or anyone in your family have previous history of blood clots? No   8. Do you or does anyone in your family have a serious bleeding problem such as prolonged bleeding following surgeries or cuts? No   9. Have you ever had problems with anemia or been told to take iron pills? No   10. Have you had any abnormal blood loss such as black, tarry or/ bloody stools, or abnormal vaginal bleeding? No   11. Have you ever had a blood  transfusion? No   12. Are you willing to have a blood transfusion if it is medically needed before, during, or after your surgery? Yes   13. Have you or any of your relatives ever had problems with anesthesia? No   14. Do you have sleep apnea, excessive snoring or daytime drowsiness? No   15. Do you have any artifical heart valves or other implanted medical devices like a pacemaker, defibrillator, or continuous glucose monitor? No   16. Do you have artificial joints? No   17. Are you allergic to latex? No   18. Is there any chance that you may be pregnant? No       Health Care Directive:  Patient does not have a Health Care Directive or Living Will:       Status of Chronic Conditions:  See problem list for active medical problems.  Problems all longstanding and stable, except as noted/documented.  See ROS for pertinent symptoms related to these conditions.      Review of Systems  CONSTITUTIONAL: NEGATIVE for fever, chills, change in weight  INTEGUMENTARY/SKIN: NEGATIVE for worrisome rashes, moles or lesions  EYES: NEGATIVE for vision changes or irritation  ENT/MOUTH: NEGATIVE for ear, mouth and throat problems  RESP: NEGATIVE for significant cough or SOB  CV: NEGATIVE for chest pain, palpitations or peripheral edema  GI: +Heartburn/left upper quadrant pain. NEGATIVE for nausea, or change in bowel habits  : NEGATIVE for frequency, dysuria, or hematuria  MUSCULOSKELETAL: NEGATIVE for significant arthralgias or myalgia  NEURO: NEGATIVE for weakness, dizziness or paresthesias  ENDOCRINE: NEGATIVE for temperature intolerance, skin/hair changes  HEME: NEGATIVE for bleeding problems  PSYCHIATRIC: NEGATIVE for changes in mood or affect    Patient Active Problem List    Diagnosis Date Noted     Hiatal hernia 10/30/2019     Priority: Medium     GERD (gastroesophageal reflux disease) 03/21/2012     Priority: Medium      Past Medical History:   Diagnosis Date     Abnormal glandular Papanicolaou smear of cervix 1998    [place] -  "dysplasia, LEEP     Endometriosis, site unspecified 1996     PONV (postoperative nausea and vomiting)     with general anes     Past Surgical History:   Procedure Laterality Date     COLONOSCOPY N/A 4/23/2018    Procedure: COLONOSCOPY;  colonoscopy;  Surgeon: Michael Mims MD;  Location: [facility]     PELVIS LAPAROSCOPY,DX  1996    Endometriosis     ZZC NONSPECIFIC PROCEDURE  1998    LEEP     Current Outpatient Medications   Medication Sig Dispense Refill     Acetaminophen (TYLENOL EXTRA STRENGTH PO) Take 1 tablet by mouth as needed.       Cholecalciferol (VITAMIN D) 1000 UNIT capsule Take 1 capsule by mouth daily.       eletriptan (RELPAX) 40 MG tablet Take 20 mg by mouth as needed       FLONASE INHA 50 MCG/DOSE NA INHALE 2 SPRAYS IN EACH NOSTRIL ONCE DAILY 1 Inhaler 4     Multiple Vitamin (MULTIVITAMINS PO) Take 1 tablet by mouth daily.       omeprazole (PRILOSEC) 40 MG DR capsule Take 1 capsule (40 mg) by mouth every morning On an empty stomach about 30 minutes before breakfast 30 capsule 3     diclofenac (VOLTAREN) 1 % topical gel Place 2 g onto the skin 4 times daily 100 g 3       Allergies   Allergen Reactions     Bactrim Hives     Codeine      migraines     Erythromycin      GI upset        Social History     Tobacco Use     Smoking status: Never Smoker     Smokeless tobacco: Never Used     Tobacco comment: No smokers in home   Substance Use Topics     Alcohol use: No     History   Drug Use No         Objective     /70   Pulse 79   Temp 98.8  F (37.1  C) (Temporal)   Resp 12   Ht 1.654 m (5' 5.12\")   Wt 61 kg (134 lb 6.4 oz)   LMP 10/27/2020   SpO2 99%   BMI 22.28 kg/m      Physical Exam    GENERAL APPEARANCE: healthy, alert and no distress     EYES: EOMI, PERRL     HENT: ear canals and TM's normal and nose and mouth without ulcers or lesions     NECK: no adenopathy, no asymmetry, masses, or scars and thyroid normal to palpation     RESP: lungs clear to auscultation - no rales, rhonchi or " wheezes     CV: regular rate and rhythm, normal S1 S2, no S3 or S4 and no murmur, click or rub     ABDOMEN:  soft, nontender, no HSM or masses and bowel sounds normal     MS: extremities normal- no gross deformities noted, no evidence of inflammation in joints, FROM in all extremities.     SKIN: no suspicious lesions or rashes     NEURO: Normal strength and tone, sensory exam grossly normal, mentation intact and speech normal. Gait is stable.     PSYCH: mentation appears normal. and affect normal/bright     LYMPHATICS: No cervical adenopathy    Recent Labs   Lab Test 10/30/19  1348   HGB 13.6           Diagnostics:  No labs were ordered during this visit.   No EKG required for low risk surgery (cataract, skin procedure, breast biopsy, etc).    Revised Cardiac Risk Index (RCRI):  The patient has the following serious cardiovascular risks for perioperative complications:   - No serious cardiac risks = 0 points     RCRI Interpretation: 0 points: Class I (very low risk - 0.4% complication rate)     Assessment & Plan   The proposed surgical procedure is considered LOW risk.      ICD-10-CM    1. Preop general physical exam  Z01.818    2. Gastroesophageal reflux disease, unspecified whether esophagitis present  K21.9    3. Hiatal hernia  K44.9            RECOMMENDATION:  APPROVAL GIVEN to proceed with proposed procedure, without further diagnostic evaluation.    Signed Electronically by: Peter Harrington PA-C    Copy of this evaluation report is provided to requesting physician.    [facility]    Revised Cardiac Risk Index

## 2020-11-20 ENCOUNTER — ANESTHESIA EVENT (OUTPATIENT)
Dept: SURGERY | Facility: AMBULATORY SURGERY CENTER | Age: 55
End: 2020-11-20

## 2020-11-20 LAB
SARS-COV-2 RNA SPEC QL NAA+PROBE: NOT DETECTED
SPECIMEN SOURCE: NORMAL

## 2020-11-23 ENCOUNTER — HOSPITAL ENCOUNTER (OUTPATIENT)
Facility: AMBULATORY SURGERY CENTER | Age: 55
Discharge: HOME | End: 2020-11-23
Attending: INTERNAL MEDICINE | Admitting: INTERNAL MEDICINE
Payer: COMMERCIAL

## 2020-11-23 ENCOUNTER — ANESTHESIA (OUTPATIENT)
Dept: SURGERY | Facility: AMBULATORY SURGERY CENTER | Age: 55
End: 2020-11-23

## 2020-11-23 VITALS
HEART RATE: 78 BPM | OXYGEN SATURATION: 98 % | SYSTOLIC BLOOD PRESSURE: 116 MMHG | TEMPERATURE: 97.4 F | RESPIRATION RATE: 16 BRPM | DIASTOLIC BLOOD PRESSURE: 71 MMHG

## 2020-11-23 VITALS — HEART RATE: 84 BPM

## 2020-11-23 LAB
HCG UR QL: NEGATIVE
UPPER GI ENDOSCOPY: NORMAL

## 2020-11-23 PROCEDURE — 43239 EGD BIOPSY SINGLE/MULTIPLE: CPT

## 2020-11-23 PROCEDURE — G8907 PT DOC NO EVENTS ON DISCHARG: HCPCS

## 2020-11-23 PROCEDURE — G8918 PT W/O PREOP ORDER IV AB PRO: HCPCS

## 2020-11-23 PROCEDURE — 88305 TISSUE EXAM BY PATHOLOGIST: CPT | Performed by: PATHOLOGY

## 2020-11-23 PROCEDURE — 81025 URINE PREGNANCY TEST: CPT | Performed by: ANESTHESIOLOGY

## 2020-11-23 RX ORDER — LIDOCAINE HYDROCHLORIDE 20 MG/ML
INJECTION, SOLUTION INFILTRATION; PERINEURAL PRN
Status: DISCONTINUED | OUTPATIENT
Start: 2020-11-23 | End: 2020-11-23

## 2020-11-23 RX ORDER — DEXAMETHASONE SODIUM PHOSPHATE 4 MG/ML
4 INJECTION, SOLUTION INTRA-ARTICULAR; INTRALESIONAL; INTRAMUSCULAR; INTRAVENOUS; SOFT TISSUE EVERY 10 MIN PRN
Status: DISCONTINUED | OUTPATIENT
Start: 2020-11-23 | End: 2020-11-24 | Stop reason: HOSPADM

## 2020-11-23 RX ORDER — LIDOCAINE 40 MG/G
CREAM TOPICAL
Status: DISCONTINUED | OUTPATIENT
Start: 2020-11-23 | End: 2020-11-24 | Stop reason: HOSPADM

## 2020-11-23 RX ORDER — ONDANSETRON 2 MG/ML
4 INJECTION INTRAMUSCULAR; INTRAVENOUS EVERY 30 MIN PRN
Status: DISCONTINUED | OUTPATIENT
Start: 2020-11-23 | End: 2020-11-24 | Stop reason: HOSPADM

## 2020-11-23 RX ORDER — SODIUM CHLORIDE, SODIUM LACTATE, POTASSIUM CHLORIDE, CALCIUM CHLORIDE 600; 310; 30; 20 MG/100ML; MG/100ML; MG/100ML; MG/100ML
INJECTION, SOLUTION INTRAVENOUS CONTINUOUS
Status: DISCONTINUED | OUTPATIENT
Start: 2020-11-23 | End: 2020-11-24 | Stop reason: HOSPADM

## 2020-11-23 RX ORDER — ONDANSETRON 2 MG/ML
4 INJECTION INTRAMUSCULAR; INTRAVENOUS EVERY 6 HOURS PRN
Status: DISCONTINUED | OUTPATIENT
Start: 2020-11-23 | End: 2020-11-24 | Stop reason: HOSPADM

## 2020-11-23 RX ORDER — NALOXONE HYDROCHLORIDE 0.4 MG/ML
0.4 INJECTION, SOLUTION INTRAMUSCULAR; INTRAVENOUS; SUBCUTANEOUS
Status: DISCONTINUED | OUTPATIENT
Start: 2020-11-23 | End: 2020-11-24 | Stop reason: HOSPADM

## 2020-11-23 RX ORDER — HYDRALAZINE HYDROCHLORIDE 20 MG/ML
2.5-5 INJECTION INTRAMUSCULAR; INTRAVENOUS EVERY 10 MIN PRN
Status: DISCONTINUED | OUTPATIENT
Start: 2020-11-23 | End: 2020-11-24 | Stop reason: HOSPADM

## 2020-11-23 RX ORDER — PROCHLORPERAZINE MALEATE 10 MG
10 TABLET ORAL EVERY 6 HOURS PRN
Status: DISCONTINUED | OUTPATIENT
Start: 2020-11-23 | End: 2020-11-24 | Stop reason: HOSPADM

## 2020-11-23 RX ORDER — ONDANSETRON 4 MG/1
4 TABLET, ORALLY DISINTEGRATING ORAL EVERY 30 MIN PRN
Status: DISCONTINUED | OUTPATIENT
Start: 2020-11-23 | End: 2020-11-24 | Stop reason: HOSPADM

## 2020-11-23 RX ORDER — PHYSOSTIGMINE SALICYLATE 1 MG/ML
1.2 INJECTION INTRAVENOUS
Status: DISCONTINUED | OUTPATIENT
Start: 2020-11-23 | End: 2020-11-24 | Stop reason: HOSPADM

## 2020-11-23 RX ORDER — NALOXONE HYDROCHLORIDE 0.4 MG/ML
0.2 INJECTION, SOLUTION INTRAMUSCULAR; INTRAVENOUS; SUBCUTANEOUS
Status: DISCONTINUED | OUTPATIENT
Start: 2020-11-23 | End: 2020-11-24 | Stop reason: HOSPADM

## 2020-11-23 RX ORDER — ONDANSETRON 2 MG/ML
4 INJECTION INTRAMUSCULAR; INTRAVENOUS
Status: DISCONTINUED | OUTPATIENT
Start: 2020-11-23 | End: 2020-11-24 | Stop reason: HOSPADM

## 2020-11-23 RX ORDER — PROPOFOL 10 MG/ML
INJECTION, EMULSION INTRAVENOUS PRN
Status: DISCONTINUED | OUTPATIENT
Start: 2020-11-23 | End: 2020-11-23

## 2020-11-23 RX ORDER — ALBUTEROL SULFATE 0.83 MG/ML
2.5 SOLUTION RESPIRATORY (INHALATION) EVERY 4 HOURS PRN
Status: DISCONTINUED | OUTPATIENT
Start: 2020-11-23 | End: 2020-11-24 | Stop reason: HOSPADM

## 2020-11-23 RX ORDER — ONDANSETRON 4 MG/1
4 TABLET, ORALLY DISINTEGRATING ORAL EVERY 6 HOURS PRN
Status: DISCONTINUED | OUTPATIENT
Start: 2020-11-23 | End: 2020-11-24 | Stop reason: HOSPADM

## 2020-11-23 RX ORDER — MEPERIDINE HYDROCHLORIDE 25 MG/ML
12.5 INJECTION INTRAMUSCULAR; INTRAVENOUS; SUBCUTANEOUS
Status: DISCONTINUED | OUTPATIENT
Start: 2020-11-23 | End: 2020-11-24 | Stop reason: HOSPADM

## 2020-11-23 RX ORDER — FLUMAZENIL 0.1 MG/ML
0.2 INJECTION, SOLUTION INTRAVENOUS
Status: DISCONTINUED | OUTPATIENT
Start: 2020-11-23 | End: 2020-11-24 | Stop reason: HOSPADM

## 2020-11-23 RX ADMIN — PROPOFOL 50 MG: 10 INJECTION, EMULSION INTRAVENOUS at 14:59

## 2020-11-23 RX ADMIN — SODIUM CHLORIDE, SODIUM LACTATE, POTASSIUM CHLORIDE, CALCIUM CHLORIDE: 600; 310; 30; 20 INJECTION, SOLUTION INTRAVENOUS at 14:54

## 2020-11-23 RX ADMIN — PROPOFOL 150 MG: 10 INJECTION, EMULSION INTRAVENOUS at 14:55

## 2020-11-23 RX ADMIN — LIDOCAINE HYDROCHLORIDE 100 MG: 20 INJECTION, SOLUTION INFILTRATION; PERINEURAL at 14:55

## 2020-11-23 RX ADMIN — SODIUM CHLORIDE, SODIUM LACTATE, POTASSIUM CHLORIDE, CALCIUM CHLORIDE: 600; 310; 30; 20 INJECTION, SOLUTION INTRAVENOUS at 13:53

## 2020-11-23 RX ADMIN — LIDOCAINE 1 EACH: 40 CREAM TOPICAL at 14:54

## 2020-11-23 NOTE — ANESTHESIA CARE TRANSFER NOTE
Patient: Michelle Roach    Procedure(s):  ESOPHAGOGASTRODUODENOSCOPY, WITH CO2 INSUFFLATION  Esophagogastroduodenoscopy, With Biopsy    Diagnosis: LUQ abdominal pain [R10.12]  Diagnosis Additional Information: No value filed.    Anesthesia Type:   MAC     Note:  Airway :Room Air  Patient transferred to:Phase II  Comments: Awake, comfortable, sats 97%, Report to Rn.Handoff Report: Identifed the Patient, Identified the Reponsible Provider, Reviewed the pertinent medical history, Discussed the surgical course, Reviewed Intra-OP anesthesia mangement and issues during anesthesia, Set expectations for post-procedure period and Allowed opportunity for questions and acknowledgement of understanding      Vitals: (Last set prior to Anesthesia Care Transfer)    CRNA VITALS  11/23/2020 1444 - 11/23/2020 1514      11/23/2020             SpO2:  97 %                Electronically Signed By: MARLYS Stevens CRNA  November 23, 2020  3:14 PM

## 2020-11-23 NOTE — ANESTHESIA POSTPROCEDURE EVALUATION
Anesthesia POST Procedure Evaluation    Patient: Michelle Roach   MRN:     [id number] Gender:   female   Age:    55 year old :      1965        Preoperative Diagnosis: LUQ abdominal pain [R10.12]   Procedure(s):  ESOPHAGOGASTRODUODENOSCOPY, WITH CO2 INSUFFLATION  Esophagogastroduodenoscopy, With Biopsy   Postop Comments: No value filed.     Anesthesia Type: MAC          Postop Pain Control: Uneventful            Sign Out: Well controlled pain   PONV: No   Neuro/Psych: Uneventful            Sign Out: Acceptable/Baseline neuro status   Airway/Respiratory: Uneventful            Sign Out: Acceptable/Baseline resp. status   CV/Hemodynamics: Uneventful            Sign Out: Acceptable CV status   Other NRE: NONE   DID A NON-ROUTINE EVENT OCCUR? No         Last Anesthesia Record Vitals:  CRNA VITALS  2020 1445 - 2020 1545      2020             SpO2:  97 %          Last PACU Vitals:  Vitals Value Taken Time   /53 20 1512   Temp 36.3  C (97.4  F) 20 1512   Pulse 82 20 1512   Resp 16 20 1512   SpO2 98 % 20 1512   Temp src     NIBP     Pulse     SpO2 97 % 20 1511   Resp     Temp     Ht Rate     Temp 2           Electronically Signed By: Jacob Gonzalez MD, 2020, 3:46 PM

## 2020-11-23 NOTE — ANESTHESIA PREPROCEDURE EVALUATION
"Anesthesia Pre-Procedure Evaluation    Patient: Michelle Roach   MRN:     [id number] Gender:   female   Age:    55 year old :      1965        Preoperative Diagnosis: LUQ abdominal pain [R10.12]   Procedure(s):  ESOPHAGOGASTRODUODENOSCOPY, WITH CO2 INSUFFLATION     LABS:  CBC:   Lab Results   Component Value Date    WBC 5.3 10/30/2019    WBC 4.4 2012    HGB 13.6 10/30/2019    HGB 13.0 2012    HCT 41.3 10/30/2019    HCT 39.1 2012     10/30/2019     (L) 2012     BMP:   Lab Results   Component Value Date     2012     2006    POTASSIUM 4.7 2012    POTASSIUM 4.3 2006    CHLORIDE 107 2012    CHLORIDE 107 2006    CO2 27 2012    CO2 29 2006    BUN 21 2012    BUN 18 2006    CR 0.57 2012    CR 0.69 2006    GLC 76 2012    GLC 94 2006     COAGS: No results found for: PTT, INR, FIBR  POC: No results found for: BGM, HCG, HCGS  OTHER:   Lab Results   Component Value Date    NATALY 9.4 2012    MAG 2.1 2012    ALBUMIN 4.2 10/30/2019    PROTTOTAL 7.4 10/30/2019    ALT 20 10/30/2019    AST 13 10/30/2019    ALKPHOS 72 10/30/2019    BILITOTAL 0.4 10/30/2019    LIPASE 125 10/30/2019    TSH 1.48 2012    SED 6 2006        Preop Vitals    BP Readings from Last 3 Encounters:   20 (!) 122/90   20 110/70   20 116/67    Pulse Readings from Last 3 Encounters:   20 79   20 82   10/30/19 70      Resp Readings from Last 3 Encounters:   20 16   20 12   18 16    SpO2 Readings from Last 3 Encounters:   20 99%   20 99%   20 98%      Temp Readings from Last 1 Encounters:   20 36.8  C (98.3  F) (Temporal)    Ht Readings from Last 1 Encounters:   20 1.654 m (5' 5.12\")      Wt Readings from Last 1 Encounters:   20 61 kg (134 lb 6.4 oz)    Estimated body mass index is 22.28 kg/m  as calculated from the following:   " " Height as of 11/19/20: 1.654 m (5' 5.12\").    Weight as of 11/19/20: 61 kg (134 lb 6.4 oz).     LDA:        Past Medical History:   Diagnosis Date     Abnormal glandular Papanicolaou smear of cervix 1998    [place] - dysplasia, LEEP     Endometriosis, site unspecified 1996     PONV (postoperative nausea and vomiting)     with general anes      Past Surgical History:   Procedure Laterality Date     COLONOSCOPY N/A 4/23/2018    Procedure: COLONOSCOPY;  colonoscopy;  Surgeon: Michael Mims MD;  Location: [facility]     PELVIS LAPAROSCOPY,DX  1996    Endometriosis     ZZC NONSPECIFIC PROCEDURE  1998    LEEP      Allergies   Allergen Reactions     Bactrim Hives     Codeine      migraines     Erythromycin      GI upset        Anesthesia Evaluation     . Pt has had prior anesthetic.     History of anesthetic complications   - PONV  PONV  x 1 with GA      ROS/MED HX    ENT/Pulmonary:       Neurologic:       Cardiovascular:         METS/Exercise Tolerance:     Hematologic:         Musculoskeletal:         GI/Hepatic:     (+) GERD hiatal hernia,       Renal/Genitourinary:         Endo:         Psychiatric:         Infectious Disease:         Malignancy:         Other:                         PHYSICAL EXAM:   Mental Status/Neuro: A/A/O   Airway: Facies: Feasible  Mallampati: I  Mouth/Opening: Full  TM distance: > 6 cm  Neck ROM: Full   Respiratory: Auscultation: CTAB     Resp. Rate: Normal     Resp. Effort: Normal      CV: Rhythm: Regular  Rate: Age appropriate  Heart: Normal Sounds  Edema: None   Comments:      Dental: Normal Dentition                Assessment:   ASA SCORE: 1    H&P: History and physical reviewed and following examination; no interval change.   Smoking Status:  Non-Smoker/Unknown   NPO Status: NPO Appropriate     Plan:   Anes. Type:  MAC   Pre-Medication: None   Induction:  N/a   Airway: Native Airway   Access/Monitoring: PIV   Maintenance: Propofol Sedation     Postop Plan:   Postop Pain: None  Postop " Sedation/Airway: Not planned  Disposition: Outpatient     PONV Management:   Adult Risk Factors: Female, H/o PONV or Motion Sickness, Non-Smoker   Prevention: Ondansetron, Propofol     CONSENT: Direct conversation   Plan and risks discussed with: Patient   Blood Products: Consent Deferred (Minimal Blood Loss)                   Jacob Gonzalez MD

## 2020-11-25 LAB — COPATH REPORT: NORMAL

## 2021-01-09 ENCOUNTER — HEALTH MAINTENANCE LETTER (OUTPATIENT)
Age: 56
End: 2021-01-09

## 2021-10-23 ENCOUNTER — HEALTH MAINTENANCE LETTER (OUTPATIENT)
Age: 56
End: 2021-10-23

## 2022-02-12 ENCOUNTER — HEALTH MAINTENANCE LETTER (OUTPATIENT)
Age: 57
End: 2022-02-12

## 2022-06-04 ENCOUNTER — HEALTH MAINTENANCE LETTER (OUTPATIENT)
Age: 57
End: 2022-06-04

## 2022-07-27 ENCOUNTER — ALLIED HEALTH/NURSE VISIT (OUTPATIENT)
Dept: FAMILY MEDICINE | Facility: OTHER | Age: 57
End: 2022-07-27
Payer: COMMERCIAL

## 2022-07-27 DIAGNOSIS — Z23 NEED FOR SHINGLES VACCINE: Primary | ICD-10-CM

## 2022-07-27 PROCEDURE — 90471 IMMUNIZATION ADMIN: CPT

## 2022-07-27 PROCEDURE — 90750 HZV VACC RECOMBINANT IM: CPT

## 2022-07-27 NOTE — PROGRESS NOTES
Prior to immunization administration, verified patients identity using patient s name and date of birth. Please see Immunization Activity for additional information.     Screening Questionnaire for Adult Immunization    Are you sick today?   No   Do you have allergies to medications, food, a vaccine component or latex?   No   Have you ever had a serious reaction after receiving a vaccination?   No   Do you have a long-term health problem with heart, lung, kidney, or metabolic disease (e.g., diabetes), asthma, a blood disorder, no spleen, complement component deficiency, a cochlear implant, or a spinal fluid leak?  Are you on long-term aspirin therapy?   No   Do you have cancer, leukemia, HIV/AIDS, or any other immune system problem?   No   Do you have a parent, brother, or sister with an immune system problem?   No   In the past 3 months, have you taken medications that affect  your immune system, such as prednisone, other steroids, or anticancer drugs; drugs for the treatment of rheumatoid arthritis, Crohn s disease, or psoriasis; or have you had radiation treatments?   No   Have you had a seizure, or a brain or other nervous system problem?   No   During the past year, have you received a transfusion of blood or blood    products, or been given immune (gamma) globulin or antiviral drug?   No   For women: Are you pregnant or is there a chance you could become       pregnant during the next month?   No   Have you received any vaccinations in the past 4 weeks?   No     Immunization questionnaire answers were all negative.        Per orders of Dr. Garcia, injection of shingrix given by Charissa Boyd MA. Patient instructed to remain in clinic for 15 minutes afterwards, and to report any adverse reaction to me immediately.       Screening performed by Charissa Boyd MA on 7/27/2022 at 1:39 PM.

## 2022-08-26 ENCOUNTER — TRANSFERRED RECORDS (OUTPATIENT)
Dept: HEALTH INFORMATION MANAGEMENT | Facility: CLINIC | Age: 57
End: 2022-08-26

## 2022-10-09 ENCOUNTER — HEALTH MAINTENANCE LETTER (OUTPATIENT)
Age: 57
End: 2022-10-09

## 2022-10-24 ENCOUNTER — MEDICAL CORRESPONDENCE (OUTPATIENT)
Dept: HEALTH INFORMATION MANAGEMENT | Facility: CLINIC | Age: 57
End: 2022-10-24

## 2022-10-28 ENCOUNTER — TRANSCRIBE ORDERS (OUTPATIENT)
Dept: OTHER | Age: 57
End: 2022-10-28

## 2022-10-28 DIAGNOSIS — D18.03 LIVER HEMANGIOMA: Primary | ICD-10-CM

## 2022-11-01 ENCOUNTER — PATIENT OUTREACH (OUTPATIENT)
Dept: SURGERY | Facility: CLINIC | Age: 57
End: 2022-11-01

## 2022-11-01 ENCOUNTER — TRANSCRIBE ORDERS (OUTPATIENT)
Dept: OTHER | Age: 57
End: 2022-11-01

## 2022-11-01 DIAGNOSIS — R16.0 LIVER MASS: Primary | ICD-10-CM

## 2022-11-01 NOTE — PROGRESS NOTES
New Patient Oncology Nurse Navigator Note     Referring provider: Sylvia Araya NP     Referring Clinic/Organization: [facility]      Referred to: Hepatobiliary Surgery      Requested provider (if applicable): First available provider    Referral Received: 11/01/22       Evaluation for : Liver hemangioma      Clinical History (per Nurse review of records provided):      See book marked documents:       Referring MD office note    Pathology report    Imaging reports     Lab Results   Component Value Date/Time    ALBUMIN 4.2 10/30/2019 01:48 PM    AST 13 10/30/2019 01:48 PM    ALT 20 10/30/2019 01:48 PM    ALKPHOS 72 10/30/2019 01:48 PM    BILITOTAL 0.4 10/30/2019 01:48 PM    WBC 5.3 10/30/2019 01:48 PM          Past Medical History:   Diagnosis Date     Abnormal glandular Papanicolaou smear of cervix 1998    [place] - dysplasia, LEEP     Endometriosis, site unspecified 1996     PONV (postoperative nausea and vomiting)     with general anes       Past Surgical History:   Procedure Laterality Date     COLONOSCOPY N/A 4/23/2018    Procedure: COLONOSCOPY;  colonoscopy;  Surgeon: Michael Mims MD;  Location: [facility]     COMBINED ESOPHAGOSCOPY, GASTROSCOPY, DUODENOSCOPY (EGD) WITH CO2 INSUFFLATION N/A 11/23/2020    Procedure: ESOPHAGOGASTRODUODENOSCOPY, WITH CO2 INSUFFLATION;  Surgeon: Kelton Bah MD;  Location: [facility]     ESOPHAGOSCOPY, GASTROSCOPY, DUODENOSCOPY (EGD), COMBINED N/A 11/23/2020    Procedure: Esophagogastroduodenoscopy, With Biopsy;  Surgeon: Kelton Bah MD;  Location: [facility]     PELVIS LAPAROSCOPY,DX  1996    Endometriosis     ZZC NONSPECIFIC PROCEDURE  1998    LEEP       Current Outpatient Medications   Medication Sig Dispense Refill     Acetaminophen (TYLENOL EXTRA STRENGTH PO) Take 1 tablet by mouth as needed.       Cholecalciferol (VITAMIN D) 1000 UNIT capsule Take 1 capsule by mouth daily.       eletriptan (RELPAX) 40 MG tablet Take 20 mg by mouth as needed       FLONASE INHA  50 MCG/DOSE NA INHALE 2 SPRAYS IN EACH NOSTRIL ONCE DAILY 1 Inhaler 4     Multiple Vitamin (MULTIVITAMINS PO) Take 1 tablet by mouth daily.       omeprazole (PRILOSEC) 40 MG DR capsule Take 1 capsule (40 mg) by mouth every morning On an empty stomach about 30 minutes before breakfast 30 capsule 3           Allergies   Allergen Reactions     Bactrim Hives     Codeine      migraines     Erythromycin      GI upset          Patient Active Problem List   Diagnosis     GERD (gastroesophageal reflux disease)     Hiatal hernia         Clinical Assessment / Barriers to Care (Per Nurse):    None at this time.     Records Location:     [facility] Everywhere     Records Needed:     ABDOMINAL IMAGING (CT SCANS, MRI, US)  DATING BACK TO 2020      Additional testing needed prior to consult:     NONE AT THIS TIME    Referral updates and Plan:     11/09/2022 2:43 PM - called and left a message for patient to call back and discuss request for patient to have CT CAP prior to visit. Provided my direct number for patient to call back.     11/10/2022 12:02 PM - Called and spoke with patient, confirmed she has had a CT scan done, informed her we will start by getting the outside CT scan and having that reviewed. If further imaging is needed after obtaining outside CT scan will follow up with her to update with recommendations.     Consult with Surgical Oncology       Sarita Parekh RN, BSN   Surgical Oncology New Patient Nurse Navigator  [facility]  [phone]

## 2022-11-10 NOTE — TELEPHONE ENCOUNTER
RECORDS STATUS - ALL OTHER DIAGNOSIS      RECORDS RECEIVED FROM:    DATE RECEIVED:    NOTES STATUS DETAILS   OFFICE NOTE from referring provider     OFFICE NOTE from medical oncologist     DISCHARGE SUMMARY from hospital     DISCHARGE REPORT from the ER     OPERATIVE REPORT     MEDICATION LIST     CLINICAL TRIAL TREATMENTS TO DATE     LABS     PATHOLOGY REPORTS     ANYTHING RELATED TO DIAGNOSIS     GENONOMIC TESTING     TYPE:     IMAGING (NEED IMAGES & REPORT)     CT SCANS PACS 8/26/22: [facility]   MRI     MAMMO     ULTRASOUND     PET

## 2022-11-21 NOTE — PROGRESS NOTES
"  Department of Surgery  Division of Surgical Oncology    New Patient Consultation  Dec 5, 2022    Michelle Roach is a 57 year old female who presents with an incidentally discovered hepatic mass. She is joined today by her .    History of Present Illness     Ms. Roach has had years ('decades') of abdominal complaints, including bloating and pain. Within the last 8 months, she has noted increased frequency and severity - almost daily, worse at night - of pain from her R back that radiates anteriorly and down into her L groin. She does have a known history of kidney stones.    CT done for abdominal pain on 8/26/22 showed a 9.7cm irregular mass in the right liver, as well as duodenitis. The liver mass was reported to be consistent with a hemangioma although MRI was recommended. US showed similar findings to the CT. MRI on 9/19/22 showed a T2 hyperintense mass involving the right posterior liver, 9.4cm in greatest dimension, with peripheral enhancement and delayed fill-in. The superior margin was lobulated an unexpectedly irregular, raising the possibility of additional vascular abnormalities including angiosarcoma (\"although unlikely, an angiosarcoma cannot be excluded\").     She denies any history of jaundice or hepatitis. She denies RUQ or R sided pain of any sort. She denies hematuria.      Past Medical History:   Diagnosis Date     Abnormal glandular Papanicolaou smear of cervix 1998    [place] - dysplasia, LEEP     Endometriosis, site unspecified 1996     Migraine      PONV (postoperative nausea and vomiting)     with general anes       Past Surgical History:   Procedure Laterality Date     COLONOSCOPY N/A 04/23/2018    Procedure: COLONOSCOPY;  colonoscopy;  Surgeon: Michael Mims MD;  Location:  GI     COMBINED ESOPHAGOSCOPY, GASTROSCOPY, DUODENOSCOPY (EGD) WITH CO2 INSUFFLATION N/A 11/23/2020    Procedure: ESOPHAGOGASTRODUODENOSCOPY, WITH CO2 INSUFFLATION;  Surgeon: Kelton Bah MD;  " Location: [facility]     ESOPHAGOSCOPY, GASTROSCOPY, DUODENOSCOPY (EGD), COMBINED N/A 11/23/2020    Procedure: Esophagogastroduodenoscopy, With Biopsy;  Surgeon: Kelton Bah MD;  Location: [facility]     OOPHORECTOMY Left     1993, endometriosis     PELVIS LAPAROSCOPY,DX  01/01/1996    Endometriosis     ZZC NONSPECIFIC PROCEDURE  01/01/1998    LEEP       Current Outpatient Medications   Medication Sig Dispense Refill     Acetaminophen (TYLENOL EXTRA STRENGTH PO) Take 1 tablet by mouth as needed.       Cholecalciferol (VITAMIN D) 1000 UNIT capsule Take 1 capsule by mouth daily.       eletriptan (RELPAX) 40 MG tablet Take 20 mg by mouth as needed       FLONASE INHA 50 MCG/DOSE NA INHALE 2 SPRAYS IN EACH NOSTRIL ONCE DAILY 1 Inhaler 4     Multiple Vitamin (MULTIVITAMINS PO) Take 1 tablet by mouth daily.       omeprazole (PRILOSEC) 40 MG DR capsule Take 1 capsule (40 mg) by mouth every morning On an empty stomach about 30 minutes before breakfast 30 capsule 3        Allergies   Allergen Reactions     Bactrim Hives     Codeine      migraines     Erythromycin      GI upset        Social History:   reports that she has never smoked. She has never used smokeless tobacco. She reports that she does not drink alcohol and does not use drugs.    Family History:  Family History   Problem Relation Age of Onset     Brain Cancer Brother      Throat cancer Paternal Grandfather      Throat cancer Maternal Aunt      Lung Cancer Paternal Aunt      Lung Cancer Maternal Uncle        Review of Systems:  A 14-point review of systems was performed, with no additional pertinent positives or negatives beyond those mentioned in the history of present illness.      Physical Exam     There were no vitals taken for this visit.     General: NAD, alert, oriented  HEENT: no scleral icterus, EOMI  Pulm: non-labored breathing, equal excursion bilaterally  CV: regular rate and rhythm  Abdomen: soft, non-tender, non-distended   Extremities: warm,  no edema  Skin: no apparent rashes or lesions  Neuro: no significant functional deficit, able to ambulate to and from exam table without assistance    Labs:  No recent LFTs    Imaging:  Reviewed in HPI      Assessment & Plan     Michelle Roach is a 57 year old female with an incidental R hepatic mass, likely hemangioma.    The natural history of hepatic hemangiomas was discussed with the patient and accompanying family members. This is the presumed diagnosis, although the outside radiologists expressed the possibility of a hepatic angiosarcoma, which would be quite rare. We have also considered other primary and secondary hepatic malignancies (CRLM, lymphoma, HCC, IHCC). For that reason and because her imaging is now several months old, I have requested updated scans. I have also recommended a colonoscopy, which is routine for a new diagnosis of a hepatic mass - her last colonoscopy was 2018 (coming up on 4 years old), and her scans showed duodenitis, which should be evaluated by EGD. She did see a local gastroenterologist whom she can be referred back to, and believes she has had testing for H pylori but is uncertain.    Hepatic hemangiomas are quite common although rarely cause clinical concern such as pain or hemorrhage. Indications for surgical resection include atypical imaging features, pain, and size (with theoretical associated concerns of hemorrhage). She would meet size criteria but has no symptoms. She is planning to see a local Urologist next week to evaluate her L sided pain and the kidney stone seen on her CT.    All questions were answered to their apparent satisfaction, and contact information was provided should additional questions or concerns arise.    Plan Summary:  -CT chest/abdomen/pelvis, liver protocol  -Present case at liver tumor board once updated scans are done; consider diagnosis and possible interventions including embolization rather than surgery  -Colonoscopy and EGD, can be arranged  locally ([facility])  -Labs including CBC, CMP, INR    Michael Pedroza MD MPH[facility]      Today's visit was centered around a presumed new diagnosis of a hepatic hemangioma. The risk of additional morbidity or mortality with or without further treatment is moderate. Total time spent was 60 minutes, including but not limited to patient-facing time, chart review, review of tests/studies as noted above, and care coordination.

## 2022-11-28 ENCOUNTER — PRE VISIT (OUTPATIENT)
Dept: SURGERY | Facility: CLINIC | Age: 57
End: 2022-11-28

## 2022-12-05 ENCOUNTER — OFFICE VISIT (OUTPATIENT)
Dept: SURGERY | Facility: CLINIC | Age: 57
End: 2022-12-05
Attending: SURGERY
Payer: COMMERCIAL

## 2022-12-05 DIAGNOSIS — R16.0 LIVER MASS: ICD-10-CM

## 2022-12-05 LAB
ALBUMIN SERPL BCG-MCNC: 4.4 G/DL (ref 3.5–5.2)
ALP SERPL-CCNC: 85 U/L (ref 35–104)
ALT SERPL W P-5'-P-CCNC: 28 U/L (ref 10–35)
ANION GAP SERPL CALCULATED.3IONS-SCNC: 8 MMOL/L (ref 7–15)
AST SERPL W P-5'-P-CCNC: 27 U/L (ref 10–35)
BASOPHILS # BLD AUTO: 0 10E3/UL (ref 0–0.2)
BASOPHILS NFR BLD AUTO: 1 %
BILIRUB SERPL-MCNC: 0.3 MG/DL
BUN SERPL-MCNC: 18 MG/DL (ref 6–20)
CALCIUM SERPL-MCNC: 9.6 MG/DL (ref 8.6–10)
CHLORIDE SERPL-SCNC: 105 MMOL/L (ref 98–107)
CREAT SERPL-MCNC: 0.59 MG/DL (ref 0.51–0.95)
DEPRECATED HCO3 PLAS-SCNC: 28 MMOL/L (ref 22–29)
EOSINOPHIL # BLD AUTO: 0.1 10E3/UL (ref 0–0.7)
EOSINOPHIL NFR BLD AUTO: 2 %
ERYTHROCYTE [DISTWIDTH] IN BLOOD BY AUTOMATED COUNT: 12.6 % (ref 10–15)
GFR SERPL CREATININE-BSD FRML MDRD: >90 ML/MIN/1.73M2
GLUCOSE SERPL-MCNC: 98 MG/DL (ref 70–99)
HCT VFR BLD AUTO: 40.4 % (ref 35–47)
HGB BLD-MCNC: 13.3 G/DL (ref 11.7–15.7)
IMM GRANULOCYTES # BLD: 0 10E3/UL
IMM GRANULOCYTES NFR BLD: 0 %
LYMPHOCYTES # BLD AUTO: 1.9 10E3/UL (ref 0.8–5.3)
LYMPHOCYTES NFR BLD AUTO: 36 %
MCH RBC QN AUTO: 30 PG (ref 26.5–33)
MCHC RBC AUTO-ENTMCNC: 32.9 G/DL (ref 31.5–36.5)
MCV RBC AUTO: 91 FL (ref 78–100)
MONOCYTES # BLD AUTO: 0.4 10E3/UL (ref 0–1.3)
MONOCYTES NFR BLD AUTO: 8 %
NEUTROPHILS # BLD AUTO: 2.8 10E3/UL (ref 1.6–8.3)
NEUTROPHILS NFR BLD AUTO: 53 %
NRBC # BLD AUTO: 0 10E3/UL
NRBC BLD AUTO-RTO: 0 /100
PLATELET # BLD AUTO: 175 10E3/UL (ref 150–450)
POTASSIUM SERPL-SCNC: 3.9 MMOL/L (ref 3.4–5.3)
PROT SERPL-MCNC: 6.9 G/DL (ref 6.4–8.3)
RBC # BLD AUTO: 4.44 10E6/UL (ref 3.8–5.2)
SODIUM SERPL-SCNC: 141 MMOL/L (ref 136–145)
WBC # BLD AUTO: 5.2 10E3/UL (ref 4–11)

## 2022-12-05 PROCEDURE — 82040 ASSAY OF SERUM ALBUMIN: CPT | Performed by: SURGERY

## 2022-12-05 PROCEDURE — 80053 COMPREHEN METABOLIC PANEL: CPT | Performed by: SURGERY

## 2022-12-05 PROCEDURE — 36415 COLL VENOUS BLD VENIPUNCTURE: CPT | Performed by: SURGERY

## 2022-12-05 PROCEDURE — G0463 HOSPITAL OUTPT CLINIC VISIT: HCPCS

## 2022-12-05 PROCEDURE — 85025 COMPLETE CBC W/AUTO DIFF WBC: CPT | Performed by: SURGERY

## 2022-12-05 PROCEDURE — 99205 OFFICE O/P NEW HI 60 MIN: CPT | Performed by: SURGERY

## 2022-12-05 NOTE — NURSING NOTE
After I verified name and date of birth. I drew labs via venipuncture on this pt while in clinic. They tolerated this well.   Labs sent down to the first floor labs.    Daphne Whitt, A

## 2022-12-05 NOTE — LETTER
"    12/5/2022         RE: Michelle Roach  [address] [facility] [address]        Dear Colleague,    Thank you for referring your patient, Michelle Roach, to the [facility]. Please see a copy of my visit note below.      Department of Surgery  Division of Surgical Oncology    New Patient Consultation  Dec 5, 2022    Michelle Roach is a 57 year old female who presents with an incidentally discovered hepatic mass. She is joined today by her .    History of Present Illness     Ms. Roach has had years ('decades') of abdominal complaints, including bloating and pain. Within the last 8 months, she has noted increased frequency and severity - almost daily, worse at night - of pain from her R back that radiates anteriorly and down into her L groin. She does have a known history of kidney stones.    CT done for abdominal pain on 8/26/22 showed a 9.7cm irregular mass in the right liver, as well as duodenitis. The liver mass was reported to be consistent with a hemangioma although MRI was recommended. US showed similar findings to the CT. MRI on 9/19/22 showed a T2 hyperintense mass involving the right posterior liver, 9.4cm in greatest dimension, with peripheral enhancement and delayed fill-in. The superior margin was lobulated an unexpectedly irregular, raising the possibility of additional vascular abnormalities including angiosarcoma (\"although unlikely, an angiosarcoma cannot be excluded\").     She denies any history of jaundice or hepatitis. She denies RUQ or R sided pain of any sort. She denies hematuria.      Past Medical History:   Diagnosis Date     Abnormal glandular Papanicolaou smear of cervix 1998    [place] - dysplasia, LEEP     Endometriosis, site unspecified 1996     Migraine      PONV (postoperative nausea and vomiting)     with general anes       Past Surgical History:   Procedure Laterality Date     COLONOSCOPY N/A 04/23/2018    Procedure: COLONOSCOPY;  colonoscopy;  " Surgeon: Michael Mims MD;  Location: [facility]     COMBINED ESOPHAGOSCOPY, GASTROSCOPY, DUODENOSCOPY (EGD) WITH CO2 INSUFFLATION N/A 11/23/2020    Procedure: ESOPHAGOGASTRODUODENOSCOPY, WITH CO2 INSUFFLATION;  Surgeon: Kelton Bah MD;  Location: [facility]     ESOPHAGOSCOPY, GASTROSCOPY, DUODENOSCOPY (EGD), COMBINED N/A 11/23/2020    Procedure: Esophagogastroduodenoscopy, With Biopsy;  Surgeon: Kelton Bah MD;  Location: [facility]     OOPHORECTOMY Left     1993, endometriosis     PELVIS LAPAROSCOPY,DX  01/01/1996    Endometriosis     ZZC NONSPECIFIC PROCEDURE  01/01/1998    LEEP       Current Outpatient Medications   Medication Sig Dispense Refill     Acetaminophen (TYLENOL EXTRA STRENGTH PO) Take 1 tablet by mouth as needed.       Cholecalciferol (VITAMIN D) 1000 UNIT capsule Take 1 capsule by mouth daily.       eletriptan (RELPAX) 40 MG tablet Take 20 mg by mouth as needed       FLONASE INHA 50 MCG/DOSE NA INHALE 2 SPRAYS IN EACH NOSTRIL ONCE DAILY 1 Inhaler 4     Multiple Vitamin (MULTIVITAMINS PO) Take 1 tablet by mouth daily.       omeprazole (PRILOSEC) 40 MG DR capsule Take 1 capsule (40 mg) by mouth every morning On an empty stomach about 30 minutes before breakfast 30 capsule 3        Allergies   Allergen Reactions     Bactrim Hives     Codeine      migraines     Erythromycin      GI upset        Social History:   reports that she has never smoked. She has never used smokeless tobacco. She reports that she does not drink alcohol and does not use drugs.    Family History:  Family History   Problem Relation Age of Onset     Brain Cancer Brother      Throat cancer Paternal Grandfather      Throat cancer Maternal Aunt      Lung Cancer Paternal Aunt      Lung Cancer Maternal Uncle        Review of Systems:  A 14-point review of systems was performed, with no additional pertinent positives or negatives beyond those mentioned in the history of present illness.      Physical Exam     There were  no vitals taken for this visit.     General: NAD, alert, oriented  HEENT: no scleral icterus, EOMI  Pulm: non-labored breathing, equal excursion bilaterally  CV: regular rate and rhythm  Abdomen: soft, non-tender, non-distended   Extremities: warm, no edema  Skin: no apparent rashes or lesions  Neuro: no significant functional deficit, able to ambulate to and from exam table without assistance    Labs:  No recent LFTs    Imaging:  Reviewed in HPI      Assessment & Plan     Michelle Roach is a 57 year old female with an incidental R hepatic mass, likely hemangioma.    The natural history of hepatic hemangiomas was discussed with the patient and accompanying family members. This is the presumed diagnosis, although the outside radiologists expressed the possibility of a hepatic angiosarcoma, which would be quite rare. We have also considered other primary and secondary hepatic malignancies (CRLM, lymphoma, HCC, IHCC). For that reason and because her imaging is now several months old, I have requested updated scans. I have also recommended a colonoscopy, which is routine for a new diagnosis of a hepatic mass - her last colonoscopy was 2018 (coming up on 4 years old), and her scans showed duodenitis, which should be evaluated by EGD. She did see a local gastroenterologist whom she can be referred back to, and believes she has had testing for H pylori but is uncertain.    Hepatic hemangiomas are quite common although rarely cause clinical concern such as pain or hemorrhage. Indications for surgical resection include atypical imaging features, pain, and size (with theoretical associated concerns of hemorrhage). She would meet size criteria but has no symptoms. She is planning to see a local Urologist next week to evaluate her L sided pain and the kidney stone seen on her CT.    All questions were answered to their apparent satisfaction, and contact information was provided should additional questions or concerns  arise.    Plan Summary:  -CT chest/abdomen/pelvis, liver protocol  -Present case at liver tumor board once updated scans are done; consider diagnosis and possible interventions including embolization rather than surgery  -Colonoscopy and EGD, can be arranged locally (NP Sylvia Araya)  -Labs including CBC, CMP, INR    Michael Pedroza MD MPHS      Today's visit was centered around a presumed new diagnosis of a hepatic hemangioma. The risk of additional morbidity or mortality with or without further treatment is moderate. Total time spent was 60 minutes, including but not limited to patient-facing time, chart review, review of tests/studies as noted above, and care coordination.        Again, thank you for allowing me to participate in the care of your patient.      Sincerely,    Michael Pedroza MD

## 2022-12-05 NOTE — NURSING NOTE
"Oncology Rooming Note    December 5, 2022 11:43 AM   Michelle Roach is a 57 year old female who presents for:    Chief Complaint   Patient presents with     Oncology Clinic Visit     Liver Mass     Initial Vitals: There were no vitals taken for this visit. Estimated body mass index is 22.28 kg/m  as calculated from the following:    Height as of 11/19/20: 1.654 m (5' 5.12\").    Weight as of 11/19/20: 61 kg (134 lb 6.4 oz). There is no height or weight on file to calculate BSA.  Data Unavailable Comment: Data Unavailable   No LMP recorded.  Allergies reviewed: Yes  Medications reviewed: Yes    Medications: Medication refills not needed today.  Pharmacy name entered into [facility]: [id number] - [address]    Clinical concerns: Pt presents today as a new patient       Milana Frye LPN  12/5/2022              "

## 2022-12-06 ENCOUNTER — PATIENT OUTREACH (OUTPATIENT)
Dept: SURGERY | Facility: CLINIC | Age: 57
End: 2022-12-06

## 2022-12-06 DIAGNOSIS — R16.0 LIVER MASS: Primary | ICD-10-CM

## 2022-12-06 NOTE — PROGRESS NOTES
Surgical Oncology RN Care Coordination Note:     Called and spoke with patient regarding follow up and plans for location of where she would like to do her scan. Per patient she would like to proceed at [facility]. Informed her I will contact the clinic and get orders sent to their office. Informed her that if she needs to call to schedule that I will update her with that information and provide number she needs to call for scheduling. She agreed with plan and will contact me directly by the end of the week if she doesn't hear from their office.     Called and left a message with [facility] radiology to confirm fax number for CT orders to be sent. Provided my direct line for their office to call back and provide information.     Received call back from [facility] and they confirmed they do not do CT scans at their location and that her scans were don't at [facility].     Called [facility] ([phone]) and confirmed they will contact the patient to schedule once they receive the orders.     Confirmed Fax number for orders as:  [phone].     Orders faxed to facility and will watch for CT scan to be scheduled to arrange follow up pending date.     Sarita Parekh, RN, BSN  Care Coordinator

## 2022-12-07 DIAGNOSIS — R16.0 LIVER MASS: Primary | ICD-10-CM

## 2022-12-22 ENCOUNTER — HOSPITAL ENCOUNTER (INPATIENT)
Dept: GENERAL RADIOLOGY | Facility: CLINIC | Age: 57
Discharge: HOME | End: 2022-12-22
Attending: SURGERY
Payer: COMMERCIAL

## 2022-12-22 PROCEDURE — 74177 CT ABD & PELVIS W/CONTRAST: CPT | Mod: 26 | Performed by: RADIOLOGY

## 2023-01-01 NOTE — PROGRESS NOTES
Michelle is a 57 year old who is being evaluated via a billable telephone visit.      Patient stated she is in the state of [place] for the visit today.    What phone number would you like to be contacted at? [phone]   How would you like to obtain your AVS? Frankie Syed, Virtual Visit Facilitator      Distant Location (provider location):  On-site  Phone call duration: 6 minutes        Department of Surgery  Division of Surgical Oncology    Follow-Up  Jan 9, 2023    Michelle Roach is a 57 year old female who last saw me on 12/5/22 for evaluation of an incidentally discovered hepatic mass. We requested repeat CT to be done and presented her case at our tumor board. Her hepatic mass was quite large and abnormal appearing, so I requested an internal formal report of her images.    History of Present Illness     Ms. Roach did undergo a lithotripsy for her known kidney stones, about 2 weeks ago. She has done well since that surgery. She denies any other changes to her medical history including no RUQ pain.      Results Reviewed:  CT 12/22/22, reviewed here:                                                              IMPRESSION:  1. Progressively enhancing liver lesion measuring up to 9.4 cm  involving the majority of right posterior lobe with abutment of major  hepatic vasculature without invasion, grossly similar in size since  8/26/2022 allowing the differences in technique.  No associated  internal hemorrhage. 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 Angiosarcoma is unlikely given imaging characteristics  typical of a benign hemangioma, including peripheral nodular and  centripedal enhancement matching blood pool and stability over 3  months.   1a. Additional nodular arterially enhancing observation within segment  4b, likely represents small vascular phenomenon given imaging  characteristics on recent MRI .   2. Persistent left sided renal pelvis stone measuring up to 1.2 cm  with mild  associated hydronephrosis, as described. Additional  bilateral nonobstructing calyceal kidney stones. 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    I, Michael Pedroza, personally reviewed the above studies.      Assessment & Plan     Michelle Roach is a 57 year old female being followed for a large, asymptomatic hemangioma.    Her case was presented at our tumor board on Friday. The appearance is consistent with a large hemangioma with no concern for sarcomatoid features as was previously suggested on another radiology read. We discussed the treatment options, which included resection, IR embolization, and observation. I disclosed the potential risks which include internal or external hemorrhage, and interval growth. Because this is asymptomatic, the tumor board recommendation was to undergo observation. After a discussion of the risks and benefits, Ms. Roach was in agreement with the plan.    All questions were answered to their apparent satisfaction, and contact information was provided should additional questions or concerns arise.    Plan Summary:  -Observation  -Repeat CT liver (abd pelvis) in 6mo, can be done locally, with telehealth visit thereafter    Michael Pedroza MD MPHS

## 2023-01-09 ENCOUNTER — VIRTUAL VISIT (OUTPATIENT)
Dept: SURGERY | Facility: CLINIC | Age: 58
End: 2023-01-09
Attending: SURGERY
Payer: COMMERCIAL

## 2023-01-09 DIAGNOSIS — D18.03 HEPATIC HEMANGIOMA: Primary | ICD-10-CM

## 2023-01-09 PROCEDURE — 99213 OFFICE O/P EST LOW 20 MIN: CPT | Mod: GT | Performed by: SURGERY

## 2023-01-09 PROCEDURE — G0463 HOSPITAL OUTPT CLINIC VISIT: HCPCS | Mod: PN,TEL | Performed by: SURGERY

## 2023-01-09 NOTE — LETTER
1/9/2023         RE: Michelle Roach  [address] [facility] [address]        Dear Colleague,    Thank you for referring your patient, Michelle Roach, to the [facility]. Please see a copy of my visit note below.    Michelle is a 57 year old who is being evaluated via a billable telephone visit.      Patient stated she is in the state of [place] for the visit today.    What phone number would you like to be contacted at? [phone]   How would you like to obtain your AVS? Frankie Syed, Virtual Visit Facilitator      Distant Location (provider location):  On-site  Phone call duration: 6 minutes        Department of Surgery  Division of Surgical Oncology    Follow-Up  Jan 9, 2023    Michelle Roach is a 57 year old female who last saw me on 12/5/22 for evaluation of an incidentally discovered hepatic mass. We requested repeat CT to be done and presented her case at our tumor board. Her hepatic mass was quite large and abnormal appearing, so I requested an internal formal report of her images.    History of Present Illness     Ms. Roach did undergo a lithotripsy for her known kidney stones, about 2 weeks ago. She has done well since that surgery. She denies any other changes to her medical history including no RUQ pain.      Results Reviewed:  CT 12/22/22, reviewed here:                                                              IMPRESSION:  1. Progressively enhancing liver lesion measuring up to 9.4 cm  involving the majority of right posterior lobe with abutment of major  hepatic vasculature without invasion, grossly similar in size since  8/26/2022 allowing the differences in technique.  No associated  internal hemorrhage. 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 Angiosarcoma is unlikely given imaging characteristics  typical of a benign hemangioma, including peripheral nodular and  centripedal enhancement matching blood pool and stability over  3  months.   1a. Additional nodular arterially enhancing observation within segment  4b, likely represents small vascular phenomenon given imaging  characteristics on recent MRI .   2. Persistent left sided renal pelvis stone measuring up to 1.2 cm  with mild associated hydronephrosis, as described. Additional  bilateral nonobstructing calyceal kidney stones. 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    I, Michael Pedroza, personally reviewed the above studies.      Assessment & Plan     Michelle Roach is a 57 year old female being followed for a large, asymptomatic hemangioma.    Her case was presented at our tumor board on Friday. The appearance is consistent with a large hemangioma with no concern for sarcomatoid features as was previously suggested on another radiology read. We discussed the treatment options, which included resection, IR embolization, and observation. I disclosed the potential risks which include internal or external hemorrhage, and interval growth. Because this is asymptomatic, the tumor board recommendation was to undergo observation. After a discussion of the risks and benefits, Ms. Roach was in agreement with the plan.    All questions were answered to their apparent satisfaction, and contact information was provided should additional questions or concerns arise.    Plan Summary:  -Observation  -Repeat CT liver (abd pelvis) in 6mo, can be done locally, with telehealth visit thereafter    Michael Pedroza MD MPHS

## 2023-01-09 NOTE — NURSING NOTE
Patient verified medications and allergies are correct via eCheck-in. Patient confirms no changes at this time and/or since last reviewed by clinic staff.    Cee Syed, Virtual Facilitator

## 2023-07-05 DIAGNOSIS — N20.0 KIDNEY STONE: ICD-10-CM

## 2023-07-05 DIAGNOSIS — R16.0 LIVER MASS: Primary | ICD-10-CM

## 2023-07-20 NOTE — PROGRESS NOTES
Department of Surgery  Division of Surgical Oncology    Follow-Up  Jul 24, 2023    Michelle Roach is a 57 year old female who is being followed for an asymptomatic but large hemangioma.    History of Present Illness   Michelle reports no new changes to her health history. She denies episodes of abdominal pain - she does have a history of kidney stones with improved pain after lithotripsy - or difficulty with deep breathing.    CT was done today    Physical Exam     There were no vitals taken for this visit.     General: NAD, alert, oriented  HEENT: no scleral icterus, EOMI  Pulm: non-labored breathing, equal excursion bilaterally  Extremities: warm, no edema  Skin: no apparent rashes or lesions  Neuro: no significant functional deficit, able to ambulate to and from exam table without assistance    Results Reviewed:  CT A/P from today:  To my interpretation, there is no appreciable change in the size of this hemangioma, which has a very classic appearance for a typical hemangioma    Radiology read available at 155pm:                                                                IMPRESSION:     1. Stable enhancing liver lesion measuring up to 9.5 cm. There  continues to be no suspicious features. No vascular invasion. Favored  to represent a benign giant cavernous hemangioma.      2. Bilateral nonobstructing renal stones.     Assessment & Plan     Michelle Roach is a 57 year old female being followed for an asymptomatic hepatic hemangioma.    Although a hemangioma is an abnormal proliferation of blood vessels, the risk of spontaneous rupture is quite rare. After reviewing her imaging at our tumor board in January and noting stability on today's CT scan, we can feel confident that this is a typical hemangioma. Hemangiomas typically do not require intervention unless they have an atypical appearance or cause symptoms. Follow-up is not needed and these do not require any lifestyle modification. I briefly reviewed  interventions for her to consider with this diagnosis but, again, would only proceed with these if there was a clinical reason. Hepatic resection would be quite extensive in this case. I did also mention bleomycin embolization as another option for intervention.    Later in the day, I followed up the formal radiology interpretation, which agreed with my impression during our visit. This is a stable hemangioma for which intervention is not warranted. If she were to develop symptoms or bleeding, resection or embolization (bleomycin) could be considered. Bleeding would be quite unlikely.    I showed her her nonobstructing renal stones - she will reach out to her Urologist to review.    At this point, I am recommending no interventions and no further observations. I described symptoms she might experience if a rare bleeding episode happened (abdominal pain, lightheadedness) and to present to an emergency room if she experienced troubling symptoms.    All questions were answered to their apparent satisfaction, and contact information was provided should additional questions or concerns arise.        Michael Pedroza MD MPHS      Total time spent was 30 minutes, including but not limited to patient-facing time, chart review, review of tests/studies as noted above, and care coordination.

## 2023-07-24 ENCOUNTER — ONCOLOGY VISIT (OUTPATIENT)
Dept: SURGERY | Facility: CLINIC | Age: 58
End: 2023-07-24
Attending: SURGERY
Payer: COMMERCIAL

## 2023-07-24 ENCOUNTER — HOSPITAL ENCOUNTER (OUTPATIENT)
Dept: CT IMAGING | Facility: CLINIC | Age: 58
Discharge: HOME | End: 2023-07-24
Attending: SURGERY
Payer: COMMERCIAL

## 2023-07-24 VITALS
WEIGHT: 136.6 LBS | TEMPERATURE: 98 F | RESPIRATION RATE: 16 BRPM | DIASTOLIC BLOOD PRESSURE: 69 MMHG | BODY MASS INDEX: 22.65 KG/M2 | HEART RATE: 65 BPM | OXYGEN SATURATION: 98 % | SYSTOLIC BLOOD PRESSURE: 116 MMHG

## 2023-07-24 DIAGNOSIS — D18.03 HEPATIC HEMANGIOMA: Primary | ICD-10-CM

## 2023-07-24 DIAGNOSIS — R16.0 LIVER MASS: ICD-10-CM

## 2023-07-24 DIAGNOSIS — N20.0 KIDNEY STONE: ICD-10-CM

## 2023-07-24 PROCEDURE — 74178 CT ABD&PLV WO CNTR FLWD CNTR: CPT | Mod: 26 | Performed by: STUDENT IN AN ORGANIZED HEALTH CARE EDUCATION/TRAINING PROGRAM

## 2023-07-24 PROCEDURE — G0463 HOSPITAL OUTPT CLINIC VISIT: HCPCS | Performed by: SURGERY

## 2023-07-24 PROCEDURE — 250N000011 HC RX IP 250 OP 636: Performed by: SURGERY

## 2023-07-24 PROCEDURE — 74178 CT ABD&PLV WO CNTR FLWD CNTR: CPT

## 2023-07-24 PROCEDURE — 99213 OFFICE O/P EST LOW 20 MIN: CPT | Performed by: SURGERY

## 2023-07-24 RX ORDER — IOPAMIDOL 755 MG/ML
82 INJECTION, SOLUTION INTRAVASCULAR ONCE
Status: COMPLETED | OUTPATIENT
Start: 2023-07-24 | End: 2023-07-24

## 2023-07-24 RX ADMIN — IOPAMIDOL 82 ML: 755 INJECTION, SOLUTION INTRAVENOUS at 10:27

## 2023-07-24 ASSESSMENT — PAIN SCALES - GENERAL: PAINLEVEL: NO PAIN (0)

## 2023-07-24 NOTE — NURSING NOTE
"Oncology Rooming Note    July 24, 2023 11:28 AM   Michelle Roach is a 57 year old female who presents for:    Chief Complaint   Patient presents with    Oncology Clinic Visit     Initial Vitals: /69   Pulse 65   Temp 98  F (36.7  C) (Oral)   Resp 16   Wt 62 kg (136 lb 9.6 oz)   SpO2 98%   BMI 22.65 kg/m   Estimated body mass index is 22.65 kg/m  as calculated from the following:    Height as of 11/19/20: 1.654 m (5' 5.12\").    Weight as of this encounter: 62 kg (136 lb 9.6 oz). Body surface area is 1.69 meters squared.  No Pain (0) Comment: Data Unavailable   No LMP recorded. Patient is perimenopausal.  Allergies reviewed: Yes  Medications reviewed: Yes    Medications: Medication refills not needed today.  Pharmacy name entered into [facility]: [id number] - [address]    Clinical concerns:  none      Virgie France            "

## 2023-07-24 NOTE — LETTER
7/24/2023         RE: Michelle Roach  [address] [facility] [address]        Dear Colleague,    Thank you for referring your patient, Michelle Roach, to the [facility]. Please see a copy of my visit note below.      Department of Surgery  Division of Surgical Oncology    Follow-Up  Jul 24, 2023    Michelle Roach is a 57 year old female who is being followed for an asymptomatic but large hemangioma.    History of Present Illness  Michelle reports no new changes to her health history. She denies episodes of abdominal pain - she does have a history of kidney stones with improved pain after lithotripsy - or difficulty with deep breathing.    CT was done today    Physical Exam    There were no vitals taken for this visit.     General: NAD, alert, oriented  HEENT: no scleral icterus, EOMI  Pulm: non-labored breathing, equal excursion bilaterally  Extremities: warm, no edema  Skin: no apparent rashes or lesions  Neuro: no significant functional deficit, able to ambulate to and from exam table without assistance    Results Reviewed:  CT A/P from today:  To my interpretation, there is no appreciable change in the size of this hemangioma, which has a very classic appearance for a typical hemangioma      Assessment & Plan    Michelle Roach is a 57 year old female being followed for an asymptomatic hepatic hemangioma.    Although a hemangioma is an abnormal proliferation of blood vessels, the risk of spontaneous rupture is quite rare. After reviewing her imaging at our tumor board in January and noting stability on today's CT scan, we can feel confident that this is a typical hemangioma. Hemangiomas typically do not require intervention unless they have an atypical appearance or cause symptoms. Follow-up is not needed and these do not require any lifestyle modification. I briefly reviewed interventions for her to consider with this diagnosis but, again, would only proceed with these if there  was a clinical reason. Hepatic resection would be quite extensive in this case. I did also mention bleomycin embolization as another option for intervention.    We will follow-up the Radiology interpretation of her scan, and will reach out if their findings are discordant with my interpretation today.    At this point, I am recommending no interventions and no further observations. I described symptoms she might experience if a rare bleeding episode happened (abdominal pain, lightheadedness) and to present to an emergency room if she experienced troubling symptoms.    All questions were answered to their apparent satisfaction, and contact information was provided should additional questions or concerns arise.        Michael Pedroza MD MPHS      Total time spent was 30 minutes, including but not limited to patient-facing time, chart review, review of tests/studies as noted above, and care coordination.

## 2023-10-28 ENCOUNTER — HEALTH MAINTENANCE LETTER (OUTPATIENT)
Age: 58
End: 2023-10-28

## 2024-10-12 ENCOUNTER — HEALTH MAINTENANCE LETTER (OUTPATIENT)
Age: 59
End: 2024-10-12

## 2024-12-21 ENCOUNTER — HEALTH MAINTENANCE LETTER (OUTPATIENT)
Age: 59
End: 2024-12-21

## (undated) RX ORDER — FENTANYL CITRATE 50 UG/ML
INJECTION, SOLUTION INTRAMUSCULAR; INTRAVENOUS
Status: DISPENSED
Start: 2022-12-24

## (undated) RX ORDER — HYDROMORPHONE HYDROCHLORIDE 1 MG/ML
INJECTION, SOLUTION INTRAMUSCULAR; INTRAVENOUS; SUBCUTANEOUS
Status: DISPENSED
Start: 2022-12-24

## (undated) RX ORDER — FENTANYL CITRATE 50 UG/ML
INJECTION, SOLUTION INTRAMUSCULAR; INTRAVENOUS
Status: DISPENSED
Start: 2018-04-23

## (undated) RX ORDER — CITRIC ACID/SODIUM CITRATE 334-500MG
SOLUTION, ORAL ORAL
Status: DISPENSED
Start: 2022-12-24

## (undated) RX ORDER — LIDOCAINE HYDROCHLORIDE 20 MG/ML
SOLUTION OROPHARYNGEAL
Status: DISPENSED
Start: 2020-11-23